# Patient Record
Sex: FEMALE | Race: WHITE
[De-identification: names, ages, dates, MRNs, and addresses within clinical notes are randomized per-mention and may not be internally consistent; named-entity substitution may affect disease eponyms.]

---

## 2017-01-10 LAB
ANION GAP SERPL CALC-SCNC: 9 MMOL/L (ref 3–11)
APPEARANCE UR: CLEAR
BASOPHILS # BLD: 0.02 K/UL (ref 0–0.2)
BASOPHILS NFR BLD: 0.3 %
BILIRUB UR-MCNC: (no result) MG/DL
BUN SERPL-MCNC: 23 MG/DL (ref 7–18)
BUN/CREAT SERPL: 19.4 (ref 10–20)
CALCIUM SERPL-MCNC: 9.2 MG/DL (ref 8.5–10.1)
CHLORIDE SERPL-SCNC: 104 MMOL/L (ref 98–107)
CO2 SERPL-SCNC: 29 MMOL/L (ref 21–32)
COLOR UR: YELLOW
COMPLETE: YES
CREAT CL PREDICTED SERPL C-G-VRATE: 40.2 ML/MIN
CREAT SERPL-MCNC: 1.2 MG/DL (ref 0.6–1.2)
EOSINOPHIL NFR BLD AUTO: 237 K/UL (ref 130–400)
GLUCOSE SERPL-MCNC: 95 MG/DL (ref 70–99)
HCT VFR BLD CALC: 38.2 % (ref 37–47)
IG%: 0.1 %
IMM GRANULOCYTES NFR BLD AUTO: 15.1 %
LYMPHOCYTES # BLD: 1.03 K/UL (ref 1.2–3.4)
MANUAL MICROSCOPIC REQUIRED?: NO
MCH RBC QN AUTO: 33.2 PG (ref 25–34)
MCHC RBC AUTO-ENTMCNC: 32.7 G/DL (ref 32–36)
MCV RBC AUTO: 101.6 FL (ref 80–100)
MONOCYTES NFR BLD: 7.8 %
NEUTROPHILS # BLD AUTO: 1.3 %
NEUTROPHILS NFR BLD AUTO: 75.4 %
NITRITE UR QL STRIP: (no result)
PH UR STRIP: 5 [PH] (ref 4.5–7.5)
PMV BLD AUTO: 10.4 FL (ref 7.4–10.4)
POTASSIUM SERPL-SCNC: 4 MMOL/L (ref 3.5–5.1)
RBC # BLD AUTO: 3.76 M/UL (ref 4.2–5.4)
REVIEW REQ?: NO
SODIUM SERPL-SCNC: 142 MMOL/L (ref 136–145)
SP GR UR STRIP: 1.02 (ref 1–1.03)
URINE BILL WITH OR WITHOUT MIC: (no result)
URINE EPITHELIAL CELL AUTO: >30 /LPF (ref 0–5)
UROBILINOGEN UR-MCNC: (no result) MG/DL
WBC # BLD AUTO: 6.82 K/UL (ref 4.8–10.8)

## 2017-01-10 NOTE — DIAGNOSTIC IMAGING REPORT
TWO VIEW CHEST



CLINICAL HISTORY: Preoperative examination.



FINDINGS: PA and lateral chest radiographs are compared to study dated

11/17/2014 and correlated with chest CT dated 6/23/2008. The heart is enlarged

and there is atherosclerotic calcification of the thoracic aorta. The pulmonary

vasculature is noncongested.  Chronic interstitial thickening is similar to

previous. The lungs and pleural spaces are otherwise clear. There is no

pneumothorax. The skeletal structures are osteopenic. Degenerative change is

noted in the thoracic spine. Surgical anchors are identified in the right

humeral head.



IMPRESSION: Cardiac enlargement with no active disease in the chest.







Electronically signed by:  Sha Frederick M.D.

1/10/2017 10:19 AM



Dictated Date/Time:  1/10/2017 10:18 AM

## 2017-01-10 NOTE — PAT MEDICATION INSTRUCTIONS
Service Date


Jung 10, 2017.





Current Home Medication List


Acetaminophen (Tylenol Arthritis Ext Rel), 1,300 MG PO PRN


Acetaminophen/Diphenhydramine (Tylenol Pm), 1 TAB PO HS


Amlodipine (Norvasc), 5 MG PO QAM


Aspirin (Aspirin Ec), 81 MG PO QAM


Calcium Carbonate-Cholecalcife (Calcium 1000 + D), 1 TAB PO QAM


Cholecalciferol (Vitamin D3), 1 CAP PO QAM


Esomeprazole Magnesium (Nexium), 40 MG PO BID


Folic Acid (Folic Acid), 1 TAB PO 6XWEEK


Gabapentin (Neurontin), 100 MG PO BID


Methotrexate Sodium (Methotrexate), 5 TAB PO WK


Metoprolol Tartrate (Lopressor) (Lopressor), 50 MG PO BID


Prednisone (Prednisone), 5 MG PO QAM


Rosuvastatin Calcium (Crestor), 20 MG PO QPM


Tramadol (Ultram), 50 MG PO BID PRN for Pain


[Vitamin B 12], 1 DOSE PO QAM





Medication Instructions


For Your Scheduled Surgery 





- Check with surgeon/prescribing physician for instructions: 


Methotrexate Sodium (Methotrexate), 5 TAB PO WK





- Hold the following medications the morning of surgery:


[Vitamin B 12], 1 DOSE PO QAM


Folic Acid (Folic Acid), 1 TAB PO 6XWEEK


Cholecalciferol (Vitamin D3), 1 CAP PO QAM


Calcium Carbonate-Cholecalcife (Calcium 1000 + D), 1 TAB PO QAM





- Take the following medications the morning of surgery with a sip of water:


Metoprolol Tartrate (Lopressor) (Lopressor), 50 MG PO BID


Prednisone (Prednisone), 5 MG PO QAM


Gabapentin (Neurontin), 100 MG PO BID


Esomeprazole Magnesium (Nexium), 40 MG PO BID


Amlodipine (Norvasc), 5 MG PO QAM


Tramadol (Ultram), 50 MG PO BID PRN for Pain (okay to take up to 4 hours prior 

to surgery if needed)


Acetaminophen (Tylenol Arthritis Ext Rel), 1,300 MG PO PRN (if needed)


Aspirin (Aspirin Ec), 81 MG PO QAM (okay per surgeon instructions)





- Take the following medications as scheduled the night before surgery:


Rosuvastatin Calcium (Crestor), 20 MG PO QPM


Metoprolol Tartrate (Lopressor) (Lopressor), 50 MG PO BID


Gabapentin (Neurontin), 100 MG PO BID


Esomeprazole Magnesium (Nexium), 40 MG PO BID


Tramadol (Ultram), 50 MG PO BID PRN for Pain (if needed)


Acetaminophen/Diphenhydramine (Tylenol Pm), 1 TAB PO HS


Acetaminophen (Tylenol Arthritis Ext Rel), 1,300 MG PO PRN (if needed)





If you have any questions please call us at [phone] (Rae Sierra PA-C)

 or [phone] or [phone]

## 2017-01-30 ENCOUNTER — HOSPITAL ENCOUNTER (INPATIENT)
Dept: HOSPITAL 45 - C.ACU | Age: 82
LOS: 7 days | Discharge: NURSING, REHAB OR LONG-TERM CARE | DRG: 459 | End: 2017-02-06
Attending: ORTHOPAEDIC SURGERY | Admitting: ORTHOPAEDIC SURGERY
Payer: COMMERCIAL

## 2017-01-30 VITALS
DIASTOLIC BLOOD PRESSURE: 71 MMHG | SYSTOLIC BLOOD PRESSURE: 180 MMHG | HEART RATE: 62 BPM | TEMPERATURE: 97.7 F | OXYGEN SATURATION: 97 %

## 2017-01-30 VITALS
BODY MASS INDEX: 32.76 KG/M2 | HEIGHT: 65 IN | WEIGHT: 196.65 LBS | BODY MASS INDEX: 32.76 KG/M2 | WEIGHT: 196.65 LBS | HEIGHT: 65 IN | BODY MASS INDEX: 32.76 KG/M2

## 2017-01-30 VITALS
DIASTOLIC BLOOD PRESSURE: 67 MMHG | TEMPERATURE: 97.52 F | HEART RATE: 61 BPM | OXYGEN SATURATION: 95 % | SYSTOLIC BLOOD PRESSURE: 167 MMHG

## 2017-01-30 VITALS — DIASTOLIC BLOOD PRESSURE: 72 MMHG | HEART RATE: 64 BPM | SYSTOLIC BLOOD PRESSURE: 179 MMHG

## 2017-01-30 VITALS — DIASTOLIC BLOOD PRESSURE: 68 MMHG | SYSTOLIC BLOOD PRESSURE: 147 MMHG | HEART RATE: 61 BPM | OXYGEN SATURATION: 95 %

## 2017-01-30 VITALS — OXYGEN SATURATION: 95 %

## 2017-01-30 VITALS
TEMPERATURE: 97.52 F | HEART RATE: 58 BPM | OXYGEN SATURATION: 90 % | DIASTOLIC BLOOD PRESSURE: 75 MMHG | SYSTOLIC BLOOD PRESSURE: 150 MMHG

## 2017-01-30 VITALS — OXYGEN SATURATION: 96 % | SYSTOLIC BLOOD PRESSURE: 165 MMHG | DIASTOLIC BLOOD PRESSURE: 76 MMHG | HEART RATE: 68 BPM

## 2017-01-30 VITALS
OXYGEN SATURATION: 93 % | SYSTOLIC BLOOD PRESSURE: 145 MMHG | TEMPERATURE: 97.88 F | DIASTOLIC BLOOD PRESSURE: 86 MMHG | HEART RATE: 60 BPM

## 2017-01-30 VITALS — DIASTOLIC BLOOD PRESSURE: 108 MMHG | HEART RATE: 55 BPM | TEMPERATURE: 98.42 F | SYSTOLIC BLOOD PRESSURE: 128 MMHG

## 2017-01-30 VITALS
SYSTOLIC BLOOD PRESSURE: 148 MMHG | OXYGEN SATURATION: 96 % | DIASTOLIC BLOOD PRESSURE: 70 MMHG | TEMPERATURE: 97.34 F | HEART RATE: 55 BPM

## 2017-01-30 VITALS
TEMPERATURE: 97.88 F | HEART RATE: 67 BPM | DIASTOLIC BLOOD PRESSURE: 70 MMHG | OXYGEN SATURATION: 96 % | SYSTOLIC BLOOD PRESSURE: 169 MMHG

## 2017-01-30 DIAGNOSIS — G92: ICD-10-CM

## 2017-01-30 DIAGNOSIS — D72.829: ICD-10-CM

## 2017-01-30 DIAGNOSIS — I13.0: ICD-10-CM

## 2017-01-30 DIAGNOSIS — M43.17: ICD-10-CM

## 2017-01-30 DIAGNOSIS — Z79.52: ICD-10-CM

## 2017-01-30 DIAGNOSIS — B37.9: ICD-10-CM

## 2017-01-30 DIAGNOSIS — N39.0: ICD-10-CM

## 2017-01-30 DIAGNOSIS — I50.31: ICD-10-CM

## 2017-01-30 DIAGNOSIS — N18.3: ICD-10-CM

## 2017-01-30 DIAGNOSIS — K21.9: ICD-10-CM

## 2017-01-30 DIAGNOSIS — E78.5: ICD-10-CM

## 2017-01-30 DIAGNOSIS — Z83.3: ICD-10-CM

## 2017-01-30 DIAGNOSIS — Z79.4: ICD-10-CM

## 2017-01-30 DIAGNOSIS — Z87.891: ICD-10-CM

## 2017-01-30 DIAGNOSIS — R13.10: ICD-10-CM

## 2017-01-30 DIAGNOSIS — E11.9: ICD-10-CM

## 2017-01-30 DIAGNOSIS — I25.10: ICD-10-CM

## 2017-01-30 DIAGNOSIS — K56.7: ICD-10-CM

## 2017-01-30 DIAGNOSIS — L40.50: ICD-10-CM

## 2017-01-30 DIAGNOSIS — G47.00: ICD-10-CM

## 2017-01-30 DIAGNOSIS — D62: ICD-10-CM

## 2017-01-30 DIAGNOSIS — B95.2: ICD-10-CM

## 2017-01-30 DIAGNOSIS — Z82.3: ICD-10-CM

## 2017-01-30 DIAGNOSIS — J81.1: ICD-10-CM

## 2017-01-30 DIAGNOSIS — R41.0: ICD-10-CM

## 2017-01-30 DIAGNOSIS — Z82.49: ICD-10-CM

## 2017-01-30 DIAGNOSIS — N20.0: ICD-10-CM

## 2017-01-30 DIAGNOSIS — M48.07: Primary | ICD-10-CM

## 2017-01-30 LAB
ANION GAP SERPL CALC-SCNC: 10 MMOL/L (ref 3–11)
BASOPHILS # BLD: 0.01 K/UL (ref 0–0.2)
BASOPHILS NFR BLD: 0.1 %
BUN SERPL-MCNC: 27 MG/DL (ref 7–18)
BUN/CREAT SERPL: 22.8 (ref 10–20)
CALCIUM SERPL-MCNC: 8.4 MG/DL (ref 8.5–10.1)
CHLORIDE SERPL-SCNC: 104 MMOL/L (ref 98–107)
CO2 SERPL-SCNC: 28 MMOL/L (ref 21–32)
COMPLETE: YES
CREAT CL PREDICTED SERPL C-G-VRATE: 40.2 ML/MIN
CREAT SERPL-MCNC: 1.2 MG/DL (ref 0.6–1.2)
EOSINOPHIL NFR BLD AUTO: 171 K/UL (ref 130–400)
EST. AVERAGE GLUCOSE BLD GHB EST-MCNC: 131 MG/DL
GLUCOSE SERPL-MCNC: 192 MG/DL (ref 70–99)
HCT VFR BLD CALC: 33.1 % (ref 37–47)
IG%: 0.2 %
IMM GRANULOCYTES NFR BLD AUTO: 3.6 %
LYMPHOCYTES # BLD: 0.37 K/UL (ref 1.2–3.4)
MCH RBC QN AUTO: 32.7 PG (ref 25–34)
MCHC RBC AUTO-ENTMCNC: 32.3 G/DL (ref 32–36)
MCV RBC AUTO: 101.2 FL (ref 80–100)
MONOCYTES NFR BLD: 1.6 %
NEUTROPHILS # BLD AUTO: 0 %
NEUTROPHILS NFR BLD AUTO: 94.5 %
PMV BLD AUTO: 9.9 FL (ref 7.4–10.4)
POTASSIUM SERPL-SCNC: 3.8 MMOL/L (ref 3.5–5.1)
RBC # BLD AUTO: 3.27 M/UL (ref 4.2–5.4)
SODIUM SERPL-SCNC: 142 MMOL/L (ref 136–145)
WBC # BLD AUTO: 10.19 K/UL (ref 4.8–10.8)

## 2017-01-30 PROCEDURE — 0SG30J1 FUSION OF LUMBOSACRAL JOINT WITH SYNTHETIC SUBSTITUTE, POSTERIOR APPROACH, POSTERIOR COLUMN, OPEN APPROACH: ICD-10-PCS | Performed by: ORTHOPAEDIC SURGERY

## 2017-01-30 RX ADMIN — HYDROMORPHONE HYDROCHLORIDE PRN MG: 10 INJECTION, SOLUTION INTRAMUSCULAR; INTRAVENOUS; SUBCUTANEOUS at 23:13

## 2017-01-30 RX ADMIN — INSULIN ASPART SCH UNITS: 100 INJECTION, SOLUTION INTRAVENOUS; SUBCUTANEOUS at 20:49

## 2017-01-30 RX ADMIN — ROSUVASTATIN CALCIUM SCH MG: 20 TABLET, FILM COATED ORAL at 20:57

## 2017-01-30 RX ADMIN — METOPROLOL TARTRATE SCH MG: 50 TABLET, FILM COATED ORAL at 20:57

## 2017-01-30 RX ADMIN — STANDARDIZED SENNA CONCENTRATE AND DOCUSATE SODIUM SCH TAB: 8.6; 5 TABLET ORAL at 20:57

## 2017-01-30 RX ADMIN — HYDROMORPHONE HYDROCHLORIDE PRN MG: 10 INJECTION, SOLUTION INTRAMUSCULAR; INTRAVENOUS; SUBCUTANEOUS at 15:04

## 2017-01-30 RX ADMIN — GABAPENTIN SCH MG: 100 CAPSULE ORAL at 20:57

## 2017-01-30 RX ADMIN — DEXAMETHASONE SODIUM PHOSPHATE SCH MLS/MIN: 4 INJECTION, SOLUTION INTRA-ARTICULAR; INTRALESIONAL; INTRAMUSCULAR; INTRAVENOUS; SOFT TISSUE at 21:49

## 2017-01-30 RX ADMIN — INSULIN ASPART SCH UNITS: 100 INJECTION, SOLUTION INTRAVENOUS; SUBCUTANEOUS at 16:59

## 2017-01-30 RX ADMIN — HYDROMORPHONE HYDROCHLORIDE PRN MG: 10 INJECTION, SOLUTION INTRAMUSCULAR; INTRAVENOUS; SUBCUTANEOUS at 12:29

## 2017-01-30 RX ADMIN — SODIUM CHLORIDE, SODIUM LACTATE, POTASSIUM CHLORIDE, AND CALCIUM CHLORIDE SCH MLS/HR: 600; 310; 30; 20 INJECTION, SOLUTION INTRAVENOUS at 13:26

## 2017-01-30 RX ADMIN — SODIUM CHLORIDE, SODIUM LACTATE, POTASSIUM CHLORIDE, AND CALCIUM CHLORIDE SCH MLS/HR: 600; 310; 30; 20 INJECTION, SOLUTION INTRAVENOUS at 17:49

## 2017-01-30 RX ADMIN — CLINDAMYCIN PHOSPHATE SCH MLS/HR: 150 INJECTION, SOLUTION INTRAVENOUS at 21:49

## 2017-01-30 RX ADMIN — DEXAMETHASONE SODIUM PHOSPHATE SCH MLS/MIN: 4 INJECTION, SOLUTION INTRA-ARTICULAR; INTRALESIONAL; INTRAMUSCULAR; INTRAVENOUS; SOFT TISSUE at 13:29

## 2017-01-30 RX ADMIN — CLINDAMYCIN PHOSPHATE SCH MLS/HR: 150 INJECTION, SOLUTION INTRAVENOUS at 13:29

## 2017-01-30 RX ADMIN — PANTOPRAZOLE SCH MG: 40 TABLET, DELAYED RELEASE ORAL at 20:58

## 2017-01-30 NOTE — OPERATIVE REPORT
DATE OF OPERATION:  01/30/2017

 

PREOPERATIVE DIAGNOSES:  Spinal stenosis, spondylolisthesis.

 

POSTOPERATIVE DIAGNOSIS:  Same.

 

PROCEDURE PERFORMED:

1.  Revision decompression, medial facetectomy and foraminotomy L2-3, L3-4,

L4-5, L5-S1.

2.  Posterior spinal fusion L2-3, L3-4, L4-5, L5-S1.

3.  Bilateral SI joint fusions.

4.  Placement of posterior segmental instrumentation using Medicrea rods and

screws L2-S1 with bilateral iliac bolts.  

5.  Interbody fusion L4-L5.

6.  Placement of PEEK cage 11 x 26 at L4-L5.

7.  Placement of locally harvested morselized autograft in posterior lateral

gutters.

8.  Placement of Infuse collagen sponge combined with Mastergraft in the

posterior lateral gutters and Brigida bone grafting interbody space.

 

SURGEON:  Dr. Viktor Blunt.

 

ASSISTANT:  None.

 

ANESTHESIA:  General.

 

DISPOSITION:  The patient awakened and taken to PACU in stable condition.

 

HISTORY OF PATIENT'S PROBLEMS:  An 81-year-old female that presents with

above-mentioned diagnosis.  After failing an extensive course of nonoperative

care, elected to undergo the above-mentioned procedure.  Risks, benefits,

pros, cons, and alternatives were outlined in detail preoperatively.

 

PROCEDURE:  The patient was met with preoperatively, case discussed and all

questions were addressed.  At that point the patient was taken back to

operative suite and after undergoing successful general intubation by the

department of anesthesia was placed in prone position on Anthony table atop

Connor frame.  All bony prominences were well padded and the eyes were

inspected to ensure there was no external pressure placed upon them.  At this

point, the thoracolumbar spine was prepped and draped in normal sterile

fashion.  Sharp dissection with the assistance of Bovie cautery was performed

down to and exposing the remaining lamina and transverse processes of 2, 3,

4, 5 and sacral ala bilaterally.  From a caudal to cephalad fashion, a

revision decompression laminectomy of L5, 4, 3, and 2 was performed

addressing severe central lateral recess stenosis.  The dura was particularly

patchless at the 4-5 level at that areas of the most extensive decompression.

 I did prophylactically cover this area with DuraSeal.  After this was

complete, pedicle screws were then placed in 2, 3, 4, 5 and S1 levels, as

well as bilateral iliac bolts and through a transforaminal approach on the

left, a complete discectomy of L4-5 was performed, endplates curetted to

subcortical bleeding bone and a 11 x 26 mm PEEK cage filled with Brigida bone

grafting tapped into position.  Appropriate size rods were then contoured and

locked into final position bilaterally and the transverse processes of 2, 3,

4, 5, the sacral ala and bilateral SI joints were burred to subcortical

bleeding bone.  Infuse collagen sponge combined with Mastergraft and locally

harvested morcellized autograft was placed in posterior lateral gutters.  A 7

flat SUE drain inserted.  Incision was closed with 1-0 Vicryl in the fascia,

2-0 Vicryl subcutaneously, 4-0 Monocryl for final skin closure.  Steri-Strips

and sterile dressing placed.  The patient was awakened and taken to PACU in

stable condition.

 

 

I attest to the content of the Intraoperative Record and any orders documented therein. Any exceptio
ns are noted below.

## 2017-01-30 NOTE — DIAGNOSTIC IMAGING REPORT
Lumbar spine LUMBAR SPINE 2 OR 3 VIEW



CLINICAL HISTORY: L2-S1 LUMBAR DECOMPRESSION/FUSION laminectomy



TECHNIQUE: Image intensifier



COMPARISON STUDY:  None



FINDINGS: Findings consistent with laminectomy and fusions from L2 through S1.

Oblique iliac bolts are also noted. Alignment is anatomic



IMPRESSION:  Postoperative lumbosacral spine changes as described 









Electronically signed by:  Abner Casas M.D.

1/30/2017 10:49 AM



Dictated Date/Time:  1/30/2017 10:44 AM

## 2017-01-30 NOTE — MEDICAL CONSULT
Consultation


Date of Consultation:


Jan 30, 2017.


Attending Physician:


Viktor Blunt D.O.


Reason for Consultation:


Medical management


History of Present Illness


This is an 82 y/o female with a history of CKD stage III, HTN, HLD, DM II, 

psoriatic arthritis, CAD and GERD who presents s/p L2-S1 decompression and 

fusion with Dr. Blunt on 1/30 for medical management.  The patient complains 

of some phlegm stuck in her throat and still has some tingling from the 

anesthesia but otherwise has no other complaints.  She is tolerating a PO diet.

  She denies passing gas or having a bowel movement yet.  A Kumar catheter is 

in place and is draining clear urine.  The patient denies fevers, chills, sweats

, chest pain, palpitations, claudication, cough, wheezing, shortness of breath, 

nausea, vomiting, abdominal pain, dysuria, hematuria, urinary retention, 

paralysis,  and weakness.





Past Medical/Surgical History


CKD stage III





HTN





HLD





DM II





Psoriatic arthritis





CAD, h/o cardiac stent





GERD





Family History





Coronary artery disease


Diabetes mellitus


Hypertension


Stroke





Social History


Smoking Status:  Former Smoker


Smokeless Tobacco Use:  No


Alcohol Use:  occasionally


Drug Use:  none


Marital Status:  


Housing Status:  lives with significant other


Occupation Status:  retired





Allergies


Coded Allergies:  


     Atorvastatin (Verified  Allergy, Mild, Unknown, on Crestor PTHM [id number] 

adm, 1/30/17)


     Azithromycin (Verified  Allergy, Mild, Unknown, 1/30/17)


     Penicillins (Verified  Allergy, Unknown, UNKNOWN, 1/30/17)


     Oxycodone (Verified  Adverse Reaction, Intermediate, NAUSEA AND VOMITING, 1 /30/17)





Current Inpatient Medications





 Current Inpatient Medications








 Medications


  (Trade)  Dose


 Ordered  Sig/Cora


 Route  Start Time


 Stop Time Status Last Admin


Dose Admin


 


 Lactated Ringer's


  (Lr 1000ml)  1,000 ml @ 


 15 mls/hr  Q24H


 IV  1/30/17 06:00


 1/31/17 05:59   


 


 


 Fentanyl Citrate


  (Fentanyl Inj)  25 mcg  Q5M  PRN


 IV  1/30/17 08:00


 1/30/17 13:00   


 


 


 Hydromorphone HCl


  (Dilaudid Inj)  0.25 mg  Q5M  PRN


 IV  1/30/17 08:00


 1/30/17 13:00   


 


 


 Ondansetron HCl


  (Zofran Inj)  4 mg  ONE  PRN


 IV  1/30/17 08:00


 1/30/17 13:00   


 


 


 Ephedrine Sulfate


  (EpHEDrine


 SULFATE INJ)  5 mg  Q5M  PRN


 IV  1/30/17 08:00


 1/30/17 13:00   


 


 


 Atropine Sulfate


  (Atropine


 Sulfate 0.1MG/Ml


 Inj)  0.5 mg  Q1M  PRN


 IV  1/30/17 08:00


 1/30/17 13:00   


 


 


 Miscellaneous


  (Floseal


 Hemostatic Matrix


 10ml)  32 ml  ONE  ONCE


 TOP  1/30/17 10:29


 1/30/17 10:30  1/30/17 10:29


32 ML


 


 Bacitracin


  (Bacitracin Inj)  50,000 units  ONE  ONCE


 IR  1/30/17 10:29


 1/30/17 10:30  1/30/17 10:29


50,000 UNITS


 


 Bupivacaine HCl/


 Epinephrine Bitart


  (Sensorcaine/


 Epinephrine 0.5%


 Mpf 1:200,000)  30 ml  ONE  ONCE


 INJ  1/30/17 10:29


 1/30/17 10:30  1/30/17 10:29


30 ML


 


 Miscellaneous 5 ml  5 ml  ONE  ONCE


 TOP  1/30/17 10:30


 1/30/17 10:31  1/30/17 10:30


5 ML


 


 Clindamycin


 Phosphate 600 mg/


 Dextrose  54 ml @ 


 100 mls/hr  Q8H


 IV  1/30/17 14:00


 1/30/17 22:33  1/30/17 13:29


100 MLS/HR


 


 Dexamethasone


 Sodium Phosphate


 6 mg/Syringe  1.5 ml @ 1


 mls/min  Q8H


 IV  1/30/17 14:00


 1/31/17 06:02  1/30/17 13:29


1 MLS/MIN


 


 Promethazine HCl/


 Sodium Chloride


  (Phenergan Inj/


 Nss 50ml)  50.5 ml @ 


 202 mls/hr  Q6H  PRN


 IV  1/30/17 11:00


 3/1/17 10:59   


 


 


 Ondansetron HCl


  (Zofran Inj)  4 mg  Q6H  PRN


 IV  1/30/17 11:00


 3/1/17 10:59   


 


 


 Metoclopramide HCl


  (Reglan Inj)  10 mg  Q6H  PRN


 IV  1/30/17 11:00


 3/1/17 10:59   


 


 


 Lorazepam 0.5 mg  0.5 mg  Q8H  PRN


 PO  1/30/17 11:00


 3/1/17 10:59   


 


 


 Lorazepam/Syringe


  (Ativan Inj/


 Syringe)  0.25 ml @ 


 1 mls/min  Q8H  PRN


 IV  1/30/17 11:00


 3/1/17 10:59   


 


 


 Pneumococcal


 Polysaccharide


 Vaccine  1 ea  PRN  PRN


 N/A  1/30/17 11:00


 3/1/17 10:59   


 


 


 Influenza Virus


 Vacc Triv Types


 A&B  1 ea  PRN  PRN


 N/A  1/30/17 11:00


 3/1/17 10:59   


 


 


 Polyethylene


  (Miralax Powder


 Packet)  17 gm  Q6


 PO  2/1/17 06:00


 3/3/17 05:59   


 


 


 Bisacodyl


  (Dulcolax Supp)  10 mg  DAILY  PRN


 FL  1/30/17 11:00


 3/1/17 10:59   


 


 


 Magnesium


 Hydroxide


  (Milk Of


 Magnesia Susp)  30 ml  DAILY  PRN


 PO  1/30/17 11:00


 3/1/17 10:59   


 


 


 Hydromorphone HCl


  (Dilaudid Inj)  0.5-1mg


 prn


 moder...  Q3H  PRN


 IV  1/31/17 06:00


 2/14/17 05:59   


 


 


 Acetaminophen/


 Hydrocodone


 Bitart  1-2 tabs


 prn


 moder...  Q4H  PRN


 PO  1/31/17 06:00


 2/14/17 05:59   


 


 


 Lactated Ringer's


  (Lr 1000ml)  1,000 ml @ 


 150 mls/hr  Q6H40M


 IV  1/30/17 10:56


 3/1/17 10:55  1/30/17 13:26


150 MLS/HR


 


 Acetaminophen


 1000 mg  1,000 mg  Q8H  PRN


 PO  1/30/17 11:00


 3/1/17 10:59   


 


 


 Acetaminophen


  (Ofirmev Iv)  100 ml @ 


 400 mls/hr  Q8H  PRN


 IV  1/30/17 11:00


 3/1/17 10:59   


 


 


 Naloxone HCl


  (Narcan Inj)  0.1 mg  Q5M  PRN


 IV  1/30/17 11:00


 3/1/17 10:59   


 


 


 Senna/Docusate


 Sodium


  (Senokot S Tab)  2 tab  HS


 PO  1/30/17 21:00


 3/1/17 20:59   


 


 


 Sodium


 Biphosphate/


 Sodium Phosphate


  (Fleet Enema)  132 ml  ONE  PRN


 FL  1/30/17 11:00


 3/1/17 10:59   


 


 


 Hydroxyzine HCl


  (Vistaril Tab)  25 mg  Q8H  PRN


 PO  1/30/17 11:00


 3/1/17 10:59   


 


 


 Al Hydroxide/Mg


 Hydroxide


  (Maalox Susp)  30 ml  Q6H  PRN


 PO  1/30/17 11:00


 3/1/17 10:59   


 


 


 Famotidine


  (Pepcid Tab)  20 mg  Q12  PRN


 PO  1/30/17 11:00


 3/1/17 10:59   


 


 


 Diphenhydramine


 HCl


  (Benadryl Cap)  25 mg  Q6H  PRN


 PO  1/30/17 11:00


 3/1/17 10:59   


 


 


 Miscellaneous


 Information


  (Discontinue PCA)  1 ea  ONE  ONCE


 N/A  1/31/17 06:00


 1/31/17 06:01   


 


 


 Naloxone HCl


  (Narcan Inj)  0.1 mg  Q5M  PRN


 IV  1/30/17 11:00


 1/31/17 06:00   


 


 


 Hydromorphone HCl


 25 mg  25 mg  PRN  PRN


 IV  1/30/17 11:00


 1/31/17 06:00  1/30/17 12:29


25 MG


 


 Sodium Chloride


  (Nss 1000ml)  1,000 ml @ 


 15 mls/hr  Q24H


 IV  1/30/17 10:56


 1/31/17 06:00   


 


 


 Amlodipine


 Besylate


  (Norvasc Tab)  5 mg  QAM


 PO  1/31/17 09:00


 3/2/17 08:59   


 


 


 Aspirin


  (Ecotrin Tab)  81 mg  QAM


 PO  1/31/17 09:00


 3/2/17 08:59   


 


 


 Gabapentin


  (Neurontin Cap)  100 mg  BID


 PO  1/30/17 21:00


 3/1/17 20:59   


 


 


 Metoprolol


 Tartrate


  (Lopressor Tab)  50 mg  BID


 PO  1/30/17 21:00


 3/1/17 20:59   


 


 


 Prednisone


  (PredniSONE TAB)  5 mg  QAM


 PO  1/31/17 09:00


 3/2/17 08:59   


 


 


 Rosuvastatin


 Calcium


  (Crestor Tab)  20 mg  QPM


 PO  1/30/17 21:00


 3/1/17 20:59   


 


 


 Tramadol HCl


  (Ultram Tab)  50 mg  BID  PRN


 PO  1/31/17 06:00


 3/2/17 05:59   


 


 


 Pantoprazole


 Sodium


  (Protonix Tab)  40 mg  BID


 PO  1/30/17 21:00


 3/1/17 20:59   


 


 


 Insulin Aspart


  (novoLOG ASPART)  **SLIDING


 SCALE**


 **G...  ACHS


 SC  1/30/17 17:15


 3/1/17 17:14 UNV  


 


 


 Glucose


  (Glucose 40% Gel)  15-30


 GRAMS 15


 GRAMS...  UD  PRN


 PO  1/30/17 13:45


 3/1/17 13:44   


 


 


 Glucose


  (Glucose Chew


 Tab)  4-8


 Tablets 4


 Tabl...  UD  PRN


 PO  1/30/17 13:45


 3/1/17 13:44   


 


 


 Dextrose


  (Dextrose 50%


 50ML Syringe)  25-50ML OF


 50% DW IV


 FOR...  UD  PRN


 IV  1/30/17 13:45


 3/1/17 13:44   


 


 


 Glucagon


  (Glucagon Inj)  1 mg  UD  PRN


 SQ  1/30/17 13:45


 3/1/17 13:44   


 











Review of Systems


See HPI for pertinent positives and negatives.  All other systems reviewed and 

negative.





Physical Exam











  Date Time  Temp Pulse Resp B/P Pulse Ox O2 Delivery O2 Flow Rate FiO2


 


1/30/17 13:25 36.6 60 16 145/86 93  4.0 


 


1/30/17 12:53     90 Nasal Cannula 4.0 


 


1/30/17 12:47  61 16 147/68 95  4.0 


 


1/30/17 12:25 36.4 58 16 150/75 90 Nasal Cannula 4.0 


 


1/30/17 12:25     90 Nasal Cannula 4.0 


 


1/30/17 12:00  59 12     


 


1/30/17 12:00  59 12  97   


 


1/30/17 11:59    176/61    


 


1/30/17 11:57 36.8 65 18 176/61 96 Nasal Cannula 4 


 


1/30/17 11:55  63 20     


 


1/30/17 11:55   20     


 


1/30/17 11:54    164/66    


 


1/30/17 11:50  60 17  98   


 


1/30/17 11:50  61 17     


 


1/30/17 11:48    178/66    


 


1/30/17 11:45  59 14     


 


1/30/17 11:45  59 14  97   


 


1/30/17 11:44  61 14     


 


1/30/17 11:44  61 14  97   


 


1/30/17 11:43    184/75    


 


1/30/17 11:39  62 22 186/62 96   


 


1/30/17 11:39  62 22     


 


1/30/17 11:36    130/106    


 


1/30/17 11:34  64 22  93   


 


1/30/17 11:34  64 22     


 


1/30/17 11:30 36.1 68 20 177/93 99 Mask 10 


 


1/30/17 11:29  64 24 181/119 100   





    181/119    


 


1/30/17 11:29  64 24     


 


1/30/17 11:24  66 18     


 


1/30/17 11:24  66 18  100   


 


1/30/17 11:20    184/87    


 


1/30/17 11:19  69 17     


 


1/30/17 11:19  69 17  100   


 


1/30/17 11:14  68 17     


 


1/30/17 11:14  68 17  99   


 


1/30/17 06:02     95 Room Air  


 


1/30/17 05:55 36.9 55 18 128/108    








General Appearance:  WD/WN, no apparent distress, + obese


Head:  normocephalic, atraumatic


Eyes:  normal inspection, PERRL, EOMI


ENT:  normal ENT inspection, hearing grossly normal, pharynx normal


Neck:  supple, no JVD, trachea midline


Respiratory/Chest:  lungs clear, normal breath sounds, no respiratory distress


Cardiovascular:  regular rate, rhythm, no gallop, + systolic murmur


Abdomen/GI:  normal bowel sounds, non tender, soft


Extremities/Musculoskelatal:  normal inspection, no calf tenderness, no pedal 

edema


Neurologic/Psych:  alert, normal mood/affect, oriented x 3


Skin:  normal color, warm/dry, no rash





Laboratory Results





Last 24 Hours








Test


  1/30/17


11:26 1/30/17


13:31


 


Bedside Glucose 163 mg/dl  











Assessment & Plan


82 y/o female with a history of CKD stage III, HTN, HLD, DM II, psoriatic 

arthritis, CAD and GERD who presents s/p L2-S1 decompression and fusion with 

Dr. Blunt on 1/30 for medical management. 


-Pain management, DVT prophylaxis, and PT/OT as per primary team





CKD stage III--baseline creatinine 1.2


-Monitor with PRP





HTN--stable


-Continue Norvasc 5 mg PO qd and Lopressor 50 mg PO BID





HLD


-Continue rosuvastatin 20 mg PO qd





Diabetes mellitus type 2--Last HgbA1c checked 5/25/16 was 6.2.  Pt is diet 

controlled


-Insulin sliding scale


-Check BSGs q ac and qhs


-Recheck HgbA1c





Psoriatic arthritis


-Hold methotrexate, pt takes 12.5 mg PO once weekly


-Continue Prednisone 5 mg PO qd





GERD


-Continue esomeprazole 40 mg PO BID





Code Status


-Level I, FULL RESUSCITATION STATUS





------------------------------------------------------------------------


Attending Consult note & attestation:


Pt seen/examined, chart reviewed, and care plan d/w SHERRI Ballard.


I agree w/ the key components of her consult documentation.  





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 

arthritis, CAD, and HTN who underwent elective lumbar decompression/fusion 

surgery earlier today by Dr. Blunt.


During my visit she complains of sore throat, mild dysphagia, frequent 

awakenings despite trying to sleep, "panic", mild subjective dyspnea but no 

chest pain. 


She also mentions mild stomach bloating. 


Reports frequent heartburn at home.  


Denies cough. 





PMH, PSH, allergies, meds, sochx, famhx, ros - reviewed 





vitals - stable, afebrile, o2 sats acceptable on NC O2 


gen - anxious, but NAD; a/o x 3; no dyspnea or distress


mouth - no lesions, MM dry


throat - clear, no edema, no erythema


neck - no JVD


heart - RRR, s1, s2, 1/6 FERNANDO LSB


lungs - CTA b/l, no rales/stridor/wheeze


abd - minimal distension, BS+, NT, no HSM


ext - no edema


neuro - strength 5/5 x 4 exts; no facial droop





FSBS < 200 since surgery 





A/P:


1.  s/p lumbar decompression/fusion


2.  subjective sore throat/swelling/dysphagia - may be related to recent 

intubation for surgery, GERD, etc.


Decadron for lumbar spine will help ; will also add H2 blocker in addition to 

her twice daily PPI.


Change diet to mech soft. 


Reassess in AM. 


3.  CAD - no ischemic symptoms post-op.


4.  concern for insomnia - defer med choice to primary ortho team.


5.  T2DM - add carb coverage in addition to correction factor. 


6.  anxiety - follow for now





RENETTA EDWARD MD

## 2017-01-30 NOTE — [facility] POST OPERATIVE BRIEF NOTE
Immediate Operative Summary


Operative Date


Jan 30, 2017.





Pre-Operative Diagnosis





Spinal Stenosis





Post-Operative Diagnosis





Spinal Stenosis





Procedure(s) Performed





L2-S1 Decompression; Posterior Spinal Fusion; Instumentation; Brigida Allograft

, 


Interbody Fusion with Application of Interbody Cage at L4-L5 and Bone 


Morphogenetic Protein with Iliac Bolts





Surgeon


Dr. Viktor Blunt





Assistant Surgeon(s)


Charlotte Craig RN





Estimated Blood Loss


250mL





Findings


stenosis





Specimens





None per surgeon

## 2017-01-30 NOTE — ANESTHESIOLOGY PROGRESS NOTE
Anesthesia Post Op Note


Date & Time


Jan 30, 2017 at 12:03





Vital Signs


Pain Intensity:  0





 Vital Signs Past 12 Hours








  Date Time  Temp Pulse Resp B/P Pulse Ox O2 Delivery O2 Flow Rate FiO2


 


1/30/17 11:44  61 14     


 


1/30/17 11:44  61 14  97   


 


1/30/17 11:43    184/75    


 


1/30/17 11:39  62 22 186/62 96   


 


1/30/17 11:39  62 22     


 


1/30/17 11:36    130/106    


 


1/30/17 11:34  64 22  93   


 


1/30/17 11:34  64 22     


 


1/30/17 11:30 36.1 68 20 177/93 99 Mask 10 


 


1/30/17 11:29  64 24 181/119 100   





    181/119    


 


1/30/17 11:29  64 24     


 


1/30/17 11:24  66 18     


 


1/30/17 11:24  66 18  100   


 


1/30/17 11:20    184/87    


 


1/30/17 11:19  69 17     


 


1/30/17 11:19  69 17  100   


 


1/30/17 11:14  68 17     


 


1/30/17 11:14  68 17  99   


 


1/30/17 06:02     95 Room Air  


 


1/30/17 05:55 36.9 55 18 128/108    











Notes


Mental Status:  alert / awake / arousable, participated in evaluation


Pt Amnestic to Procedure:  Yes


Nausea / Vomiting:  adequately controlled


Pain:  adequately controlled


Airway Patency, RR, SpO2:  stable & adequate


BP & HR:  stable & adequate


Hydration State:  stable & adequate


Anesthetic Complications:  no major complications apparent

## 2017-01-30 NOTE — HISTORY AND PHYSICAL
History & Physical


Date


Jan 30, 2017.





Chief Complaint


back and leg pain





History of Present Illness


The patient is a 81 year old female with complaints of





Additional History


Hepatic Disease:  No


Endocrine Disorder:  No


Kidney Disease:  No


Hypertension:  No


Heart Disease:  No


Bleeding Tendencies:  No


Infectious Diseases:  No





Allergies


Coded Allergies:  


     Atorvastatin (Verified  Allergy, Mild, Unknown, 1/30/17)


     Azithromycin (Verified  Allergy, Mild, Unknown, 1/30/17)


     Penicillins (Verified  Allergy, Unknown, UNKNOWN, 1/30/17)


     Oxycodone (Verified  Adverse Reaction, Intermediate, NAUSEA AND VOMITING, 1 /30/17)





Home Medications


Scheduled


Acetaminophen (Tylenol Arthritis Ext Rel), 1,300 MG PO PRN


Acetaminophen/Diphenhydramine (Tylenol Pm), 1 TAB PO HS


Amlodipine (Norvasc), 5 MG PO QAM


Aspirin (Aspirin Ec), 81 MG PO QAM


Calcium Carbonate-Cholecalcife (Calcium 1000 + D), 1 TAB PO QAM


Cholecalciferol (Vitamin D3), 1 CAP PO QAM


Esomeprazole Magnesium (Nexium), 40 MG PO BID


Folic Acid (Folic Acid), 1 TAB PO 6XWEEK


Gabapentin (Neurontin), 100 MG PO BID


Methotrexate Sodium (Methotrexate), 5 TAB PO WK


Metoprolol Tartrate (Lopressor) (Lopressor), 50 MG PO BID


Prednisone (Prednisone), 5 MG PO QAM


Rosuvastatin Calcium (Crestor), 20 MG PO QPM


[Vitamin B 12], 1 DOSE PO QAM





Scheduled PRN


Tramadol (Ultram), 50 MG PO BID PRN for Pain





Physical Examination


Skin:  warm/dry, no rash


Eyes:  normal inspection, EOMI, sclerae normal


ENT:  normal ENT inspection, pharynx normal


Head:  normocephalic, atraumatic


Neck:  supple, no adenopathy, trachea midline


Respiratory/Chest:  lungs clear, normal breath sounds, no respiratory distress


Cardiovascular:  regular rate, rhythm, no edema, no murmur


Abdomen / GI:  normal bowel sounds, non tender


Back:  normal inspection


Extremities:  normal inspection, normal range of motion


Neurologic/Psych:  no motor/sensory deficits, alert, normal reflexes, oriented 

x 3





Diagnosis


spinal stenosis





Plan of Treatment


decompression fusion L2-S1

## 2017-01-31 VITALS
TEMPERATURE: 97.7 F | DIASTOLIC BLOOD PRESSURE: 68 MMHG | SYSTOLIC BLOOD PRESSURE: 187 MMHG | HEART RATE: 68 BPM | OXYGEN SATURATION: 95 %

## 2017-01-31 VITALS
DIASTOLIC BLOOD PRESSURE: 72 MMHG | SYSTOLIC BLOOD PRESSURE: 195 MMHG | TEMPERATURE: 98.06 F | OXYGEN SATURATION: 97 % | HEART RATE: 70 BPM

## 2017-01-31 VITALS
HEART RATE: 63 BPM | DIASTOLIC BLOOD PRESSURE: 66 MMHG | TEMPERATURE: 98.24 F | SYSTOLIC BLOOD PRESSURE: 167 MMHG | OXYGEN SATURATION: 97 %

## 2017-01-31 VITALS
DIASTOLIC BLOOD PRESSURE: 61 MMHG | TEMPERATURE: 98.78 F | HEART RATE: 78 BPM | OXYGEN SATURATION: 93 % | SYSTOLIC BLOOD PRESSURE: 181 MMHG

## 2017-01-31 VITALS — SYSTOLIC BLOOD PRESSURE: 175 MMHG | DIASTOLIC BLOOD PRESSURE: 69 MMHG

## 2017-01-31 VITALS
DIASTOLIC BLOOD PRESSURE: 71 MMHG | TEMPERATURE: 98.24 F | OXYGEN SATURATION: 93 % | HEART RATE: 66 BPM | SYSTOLIC BLOOD PRESSURE: 154 MMHG

## 2017-01-31 VITALS — DIASTOLIC BLOOD PRESSURE: 54 MMHG | SYSTOLIC BLOOD PRESSURE: 169 MMHG

## 2017-01-31 VITALS
OXYGEN SATURATION: 97 % | SYSTOLIC BLOOD PRESSURE: 140 MMHG | HEART RATE: 64 BPM | DIASTOLIC BLOOD PRESSURE: 62 MMHG | TEMPERATURE: 98.06 F

## 2017-01-31 VITALS — DIASTOLIC BLOOD PRESSURE: 71 MMHG | SYSTOLIC BLOOD PRESSURE: 194 MMHG

## 2017-01-31 VITALS — HEART RATE: 69 BPM | DIASTOLIC BLOOD PRESSURE: 65 MMHG | SYSTOLIC BLOOD PRESSURE: 187 MMHG

## 2017-01-31 LAB
ANION GAP SERPL CALC-SCNC: 11 MMOL/L (ref 3–11)
BASOPHILS # BLD: 0.01 K/UL (ref 0–0.2)
BASOPHILS NFR BLD: 0.1 %
BUN SERPL-MCNC: 24 MG/DL (ref 7–18)
BUN/CREAT SERPL: 21.9 (ref 10–20)
CALCIUM SERPL-MCNC: 8.2 MG/DL (ref 8.5–10.1)
CHLORIDE SERPL-SCNC: 99 MMOL/L (ref 98–107)
CO2 SERPL-SCNC: 25 MMOL/L (ref 21–32)
COMPLETE: YES
CREAT CL PREDICTED SERPL C-G-VRATE: 43.8 ML/MIN
CREAT SERPL-MCNC: 1.1 MG/DL (ref 0.6–1.2)
EOSINOPHIL NFR BLD AUTO: 206 K/UL (ref 130–400)
GLUCOSE SERPL-MCNC: 162 MG/DL (ref 70–99)
HCT VFR BLD CALC: 30.3 % (ref 37–47)
IG%: 0.2 %
IMM GRANULOCYTES NFR BLD AUTO: 2.2 %
LYMPHOCYTES # BLD: 0.29 K/UL (ref 1.2–3.4)
MCH RBC QN AUTO: 32.9 PG (ref 25–34)
MCHC RBC AUTO-ENTMCNC: 33.3 G/DL (ref 32–36)
MCV RBC AUTO: 98.7 FL (ref 80–100)
MONOCYTES NFR BLD: 5.9 %
NEUTROPHILS # BLD AUTO: 0 %
NEUTROPHILS NFR BLD AUTO: 91.6 %
PMV BLD AUTO: 9.8 FL (ref 7.4–10.4)
POTASSIUM SERPL-SCNC: 4.4 MMOL/L (ref 3.5–5.1)
RBC # BLD AUTO: 3.07 M/UL (ref 4.2–5.4)
SODIUM SERPL-SCNC: 135 MMOL/L (ref 136–145)
WBC # BLD AUTO: 13.19 K/UL (ref 4.8–10.8)

## 2017-01-31 RX ADMIN — HYDROCODONE BITARTRATE AND ACETAMINOPHEN PRN TAB: 5; 325 TABLET ORAL at 12:47

## 2017-01-31 RX ADMIN — LORAZEPAM PRN MLS/MIN: 2 INJECTION INTRAMUSCULAR; INTRAVENOUS at 21:51

## 2017-01-31 RX ADMIN — TRAMADOL HYDROCHLORIDE PRN MG: 50 TABLET, COATED ORAL at 09:50

## 2017-01-31 RX ADMIN — STANDARDIZED SENNA CONCENTRATE AND DOCUSATE SODIUM SCH TAB: 8.6; 5 TABLET ORAL at 21:00

## 2017-01-31 RX ADMIN — INSULIN ASPART SCH UNITS: 100 INJECTION, SOLUTION INTRAVENOUS; SUBCUTANEOUS at 18:20

## 2017-01-31 RX ADMIN — INSULIN ASPART SCH UNITS: 100 INJECTION, SOLUTION INTRAVENOUS; SUBCUTANEOUS at 21:00

## 2017-01-31 RX ADMIN — Medication SCH MG: at 09:49

## 2017-01-31 RX ADMIN — INSULIN ASPART SCH UNITS: 100 INJECTION, SOLUTION INTRAVENOUS; SUBCUTANEOUS at 13:17

## 2017-01-31 RX ADMIN — METOPROLOL TARTRATE SCH MG: 50 TABLET, FILM COATED ORAL at 21:41

## 2017-01-31 RX ADMIN — LORAZEPAM PRN MLS/MIN: 2 INJECTION INTRAMUSCULAR; INTRAVENOUS at 02:39

## 2017-01-31 RX ADMIN — GABAPENTIN SCH MG: 100 CAPSULE ORAL at 21:45

## 2017-01-31 RX ADMIN — GABAPENTIN SCH MG: 100 CAPSULE ORAL at 09:49

## 2017-01-31 RX ADMIN — INSULIN ASPART SCH UNITS: 100 INJECTION, SOLUTION INTRAVENOUS; SUBCUTANEOUS at 08:50

## 2017-01-31 RX ADMIN — HYDROCODONE BITARTRATE AND ACETAMINOPHEN PRN TAB: 5; 325 TABLET ORAL at 20:12

## 2017-01-31 RX ADMIN — PANTOPRAZOLE SCH MG: 40 TABLET, DELAYED RELEASE ORAL at 21:00

## 2017-01-31 RX ADMIN — ONDANSETRON PRN MG: 2 INJECTION INTRAMUSCULAR; INTRAVENOUS at 07:40

## 2017-01-31 RX ADMIN — ONDANSETRON PRN MG: 2 INJECTION INTRAMUSCULAR; INTRAVENOUS at 00:55

## 2017-01-31 RX ADMIN — PANTOPRAZOLE SCH MG: 40 TABLET, DELAYED RELEASE ORAL at 08:22

## 2017-01-31 RX ADMIN — METOPROLOL TARTRATE SCH MG: 50 TABLET, FILM COATED ORAL at 08:21

## 2017-01-31 RX ADMIN — SODIUM CHLORIDE, SODIUM LACTATE, POTASSIUM CHLORIDE, AND CALCIUM CHLORIDE SCH MLS/HR: 600; 310; 30; 20 INJECTION, SOLUTION INTRAVENOUS at 01:23

## 2017-01-31 RX ADMIN — ROSUVASTATIN CALCIUM SCH MG: 20 TABLET, FILM COATED ORAL at 21:00

## 2017-01-31 RX ADMIN — DEXAMETHASONE SODIUM PHOSPHATE SCH MLS/MIN: 4 INJECTION, SOLUTION INTRA-ARTICULAR; INTRALESIONAL; INTRAMUSCULAR; INTRAVENOUS; SOFT TISSUE at 05:55

## 2017-01-31 NOTE — DIAGNOSTIC IMAGING REPORT
AP CHEST WITH ABDOMINAL SERIES



CLINICAL HISTORY:  Abdominal distention post surgery.



FINDINGS: 



An AP upright chest radiograph is compared to study dated 1/10/2017. The

Examination is degraded by large body habitus and patient rotation. The heart is

enlarged and there is atherosclerotic calcification of the thoracic aorta. The

pulmonary vasculature is noncongested. Bibasilar atelectasis is observed and

there is chronic interstitial thickening. No focal airspace consolidation or

large pleural effusion is identified. No pneumothorax is seen. The skeletal

structures are osteopenic. The bony thorax is grossly intact. Surgical anchors

are noted in the right humeral head.



Supine and decubitus abdominal radiograph are correlated with abdominal CT dated

11/24/2015. There is a nonobstructed abdominal bowel gas pattern. Mild gaseous

distention of the small bowel and colon may be related to postoperative ileus.

Mild to moderate colonic fecal retention is observed. No evidence of

intraperitoneal free air is seen. There is a nonobstructing right renal

calculus. Phleboliths are seen in the pelvis. There is lumbosacral spondylosis

with extensive spinal fusion hardware. A surgical drain projects over the spine.

Sacroiliac bolts are in place. The bony pelvis appears intact.





IMPRESSION:



1. Cardiomegaly with no acute cardiopulmonary abnormality.



2. Nonobstructed abdominal bowel gas pattern. Mild gaseous distention of the

small bowel and colon may represent a mild postoperative ileus. Clinical

correlation will be required.



3. Nonobstructing right renal calculus.







Electronically signed by:  Sha Frederick M.D.

1/31/2017 5:19 PM



Dictated Date/Time:  1/31/2017 5:16 PM

## 2017-01-31 NOTE — ANESTHESIOLOGY PROGRESS NOTE
Anesthesia Post Op Note


Date & Time


Jan 31, 2017 at 07:59





Vital Signs





 Vital Signs Past 12 Hours








  Date Time  Temp Pulse Resp B/P Pulse Ox O2 Delivery O2 Flow Rate FiO2


 


1/31/17 07:19 36.7 70 16 180/70 97 Nasal Cannula 2.0 





    195/72  Humidified Oxygen  


 


1/31/17 04:16 36.8 63 18 167/66 97 Nasal Cannula  





      Humidified Oxygen  


 


1/31/17 02:00    169/54    


 


1/31/17 00:31    175/69    


 


1/31/17 00:21 36.5 68 20 187/68 95   


 


1/31/17 00:00      Nasal Cannula 2.0 


 


1/30/17 23:05 36.6 67 18 169/70 96 Nasal Cannula  


 


1/30/17 20:56  64  179/72    


 


1/30/17 20:00  68 16 165/76 96 Nasal Cannula 2.0 





      Humidified Oxygen  











Notes


Mental Status:  alert / awake / arousable, participated in evaluation


Pt Amnestic to Procedure:  Yes


Nausea / Vomiting:  adequately controlled


Pain:  adequately controlled


Airway Patency, RR, SpO2:  stable & adequate


BP & HR:  stable & adequate


Hydration State:  stable & adequate


Anesthetic Complications:  no major complications apparent

## 2017-01-31 NOTE — HOSPITALIST PROGRESS NOTE
Hospitalist Progress Note


Date of Service


Jan 31, 2017.


 (Jacquelin Ballard ., PA-C)





Subjective


Pt evaluation today including:  conversation w/ patient, conversation w/ family 

( at bedside), physical exam, chart review, lab review, review of 

inpatient medication list


Voiding:  gomez catheter in place


The patient complained of some nausea this morning and did report 2 episodes of 

vomiting.  She received IV Zofran and states that it did resolve her nausea.  

She had some abdominal discomfort while she was nauseous, but that resolved 

after she vomited.  As a result of her symptoms, she did not eat much this 

morning.  She denies any dysphagia.  Patient complains of cough but states that 

it is consistent with her chronic cough.  The patient did have some 

hypertension overnight and this morning, started on hydralazine 5 mg IV q6h prn 

SBP >175 by Dr. Quinn overnight.  BP has improved but is persistently 

elevated.  The patient currently denies fevers, chills, sweats, chest pain, 

palpitations, claudication, wheezing, shortness of breath, abdominal pain, 

dysuria, hematuria, urinary retention, paralysis, weakness, numbness and 

tingling.





   Additional Comments:


See HPI for pertinent positives and negatives.  All other systems reviewed and 

negative.


 (Jacquelin Ballard ., PA-C)





Objective


Vital Signs











  Date Time  Temp Pulse Resp B/P Pulse Ox O2 Delivery O2 Flow Rate FiO2


 


1/31/17 10:48 36.8 66 18 154/71 93 Nasal Cannula 1.0 





      Humidified Oxygen  


 


1/31/17 08:13      Nasal Cannula 4.0 


 


1/31/17 07:19 36.7 70 16 180/70 97 Nasal Cannula 2.0 





    195/72  Humidified Oxygen  


 


1/31/17 04:16 36.8 63 18 167/66 97 Nasal Cannula  





      Humidified Oxygen  


 


1/31/17 02:00    169/54    


 


1/31/17 00:31    175/69    


 


1/31/17 00:21 36.5 68 20 187/68 95   


 


1/31/17 00:00      Nasal Cannula 2.0 


 


1/30/17 23:05 36.6 67 18 169/70 96 Nasal Cannula  


 


1/30/17 20:56  64  179/72    


 


1/30/17 20:00  68 16 165/76 96 Nasal Cannula 2.0 





      Humidified Oxygen  


 


1/30/17 19:17 36.5 62 16 180/71 97 Nasal Cannula 4.0 


 


1/30/17 15:25 36.4 61 16 167/67 95 Nasal Cannula 4.0 


 


1/30/17 15:25      Nasal Cannula 4.0 


 


1/30/17 14:25 36.3 55 17 148/70 96 Nasal Cannula 4.0 


 


1/30/17 13:25 36.6 60 16 145/86 93  4.0 


 


1/30/17 12:53     90 Nasal Cannula 4.0 


 


1/30/17 12:47  61 16 147/68 95  4.0 








 (Jacquelin Ballard ., PA-C)





Physical Exam


General Appearance:  WD/WN, no apparent distress, + obese


Eyes:  normal inspection, PERRL, EOMI


ENT:  normal ENT inspection, hearing grossly normal, pharynx normal, + 

pertinent finding (dry oral mucosa)


Neck:  supple, no JVD, trachea midline


Respiratory/Chest:  lungs clear, normal breath sounds, no respiratory distress


Cardiovascular:  regular rate, rhythm, no gallop, + systolic murmur


Abdomen:  normal bowel sounds, non tender, soft


Extremities:  non-tender, normal inspection, no pedal edema


Neurologic/Psychiatric:  alert, normal mood/affect, oriented x 3


Skin:  normal color, warm/dry, no rash


 (Jacquelin Ballard ., PA-C)





Laboratory Results





Last 24 Hours








Test


  1/30/17


14:01 1/30/17


16:51 1/30/17


20:43 1/31/17


06:18


 


White Blood Count 10.19 K/uL    13.19 K/uL 


 


Red Blood Count 3.27 M/uL    3.07 M/uL 


 


Hemoglobin 10.7 g/dL    10.1 g/dL 


 


Hematocrit 33.1 %    30.3 % 


 


Mean Corpuscular Volume 101.2 fL    98.7 fL 


 


Mean Corpuscular Hemoglobin 32.7 pg    32.9 pg 


 


Mean Corpuscular Hemoglobin


Concent 32.3 g/dl 


  


  


  33.3 g/dl 


 


 


Platelet Count 171 K/uL    206 K/uL 


 


Mean Platelet Volume 9.9 fL    9.8 fL 


 


Neutrophils (%) (Auto) 94.5 %    91.6 % 


 


Lymphocytes (%) (Auto) 3.6 %    2.2 % 


 


Monocytes (%) (Auto) 1.6 %    5.9 % 


 


Eosinophils (%) (Auto) 0.0 %    0.0 % 


 


Basophils (%) (Auto) 0.1 %    0.1 % 


 


Neutrophils # (Auto) 9.63 K/uL    12.08 K/uL 


 


Lymphocytes # (Auto) 0.37 K/uL    0.29 K/uL 


 


Monocytes # (Auto) 0.16 K/uL    0.78 K/uL 


 


Eosinophils # (Auto) 0.00 K/uL    0.00 K/uL 


 


Basophils # (Auto) 0.01 K/uL    0.01 K/uL 


 


RDW Standard Deviation 54.7 fL    53.2 fL 


 


RDW Coefficient of Variation 14.8 %    14.9 % 


 


Immature Granulocyte % (Auto) 0.2 %    0.2 % 


 


Immature Granulocyte # (Auto) 0.02 K/uL    0.03 K/uL 


 


Sodium Level 142 mmol/L    135 mmol/L 


 


Potassium Level 3.8 mmol/L    4.4 mmol/L 


 


Chloride Level 104 mmol/L    99 mmol/L 


 


Carbon Dioxide Level 28 mmol/L    25 mmol/L 


 


Anion Gap 10.0 mmol/L    11.0 mmol/L 


 


Blood Urea Nitrogen 27 mg/dl    24 mg/dl 


 


Creatinine 1.20 mg/dl    1.10 mg/dl 


 


Est Creatinine Clear Calc


Drug Dose 40.2 ml/min 


  


  


  43.8 ml/min 


 


 


Estimated GFR (


American) 49.1 


  


  


  54.5 


 


 


Estimated GFR (Non-


American 42.4 


  


  


  47.0 


 


 


BUN/Creatinine Ratio 22.8    21.9 


 


Random Glucose 192 mg/dl    162 mg/dl 


 


Estimated Average Glucose 131 mg/dl    


 


Hemoglobin A1c 6.2 %    


 


Calcium Level 8.4 mg/dl    8.2 mg/dl 


 


Bedside Glucose  184 mg/dl  176 mg/dl  














Test


  1/31/17


07:58 1/31/17


12:02 


  


 


 


Bedside Glucose 183 mg/dl  145 mg/dl   








 (Jacquelin Ballard, PA-C)





Assessment and Plan


80 y/o female with a history of CKD stage III, HTN, HLD, DM II, psoriatic 

arthritis, CAD and GERD who presents s/p L2-S1 decompression and fusion with 

Dr. Blunt on 1/30 for medical management. 


-Pain management, DVT prophylaxis, and PT/OT as per primary team


-Abdominal series x-ray ordered to assess for ileus


-If ileus, change to clear liquid diet





CKD stage III--stable.  Baseline creatinine 1.2


-Monitor with PRP, creatinine stable





HTN--pt has been consistently hypertensive


-Continue Lopressor 50 mg PO BID as pt has many periods of bradycardia


-Increase Norvasc to 10 mg PO qd


-Continue to cover with hydralazine 5 mg IV q6h prn SBP >175





HLD


-Continue rosuvastatin 20 mg PO qd





Diabetes mellitus type 2--Last HgbA1c checked 5/25/16 was 6.2.  Pt is diet 

controlled


-Insulin sliding scale


-Check BSGs q ac and qhs


-Rechecked HgbA1c on 1/30 was 6.2





Psoriatic arthritis


-Hold methotrexate, pt takes 12.5 mg PO once weekly


-Continue Prednisone 5 mg PO qd





GERD


-Continue esomeprazole 40 mg PO BID


-Continue famotidine 20 mg PO BID





Code Status


-Level I, FULL RESUSCITATION STATUS


 (Jacquelin Ballard ., PA-C)


Attending Attestation:


Pt seen/examined, chart reviewed, care plan d/w SHERRI Ballard.


I agree w/ the key components of her consult note.





Pt with nausea/emesis this am.  


No dysphagia.


No sob.  





VSS but with labile BPs


no fever


gen - NAD


mouth - MM dry but improved from yesterday


heart - RRR


lungs - fine dry rales bases (mild)


abd - distended, mildly tender upper quadrants, BS+


ext - no edema





labs -


Cr 1.1


CBC with Hb 10.1 and WBC 13





A/P:


1.  suspected ileus - x-rays ordered and c/w such.  


Clear liquids.  


Dulcolax suppos x 1.


Continue miralax as tolerated.  





2.  dysphagia - resolved.  





3.  T2DM - despite steroids FSBS controlled. 





4.  HTN - labile; agree w/ increase CCB. 





5.  insomnia - benadryl prn. 





RENETTA LEYVA MD


 (Jadiel Leyva MD)

## 2017-01-31 NOTE — PROGRESS NOTE
DATE: 01/31/2017

 

SUBJECTIVE:  Postop day 1.  Back pain is controlled.  Leg pain improved. 

Vital signs stable.  T-max 36.8.  SUE drained 170 mL over the last shift. 

Hematocrit this a.m. is stable at 30.3.  On exam she has good strength to

testing and is comfortable.

 

PLAN:  At this time, we are progressing slowly.  She is somewhat nauseated,

we will keep her in bed today which is sitting up at the bedside as long as

she tolerates this, we will increase therapy tomorrow.

## 2017-02-01 VITALS
OXYGEN SATURATION: 98 % | TEMPERATURE: 98.42 F | DIASTOLIC BLOOD PRESSURE: 75 MMHG | SYSTOLIC BLOOD PRESSURE: 152 MMHG | HEART RATE: 72 BPM

## 2017-02-01 VITALS — HEART RATE: 74 BPM | SYSTOLIC BLOOD PRESSURE: 142 MMHG | DIASTOLIC BLOOD PRESSURE: 48 MMHG

## 2017-02-01 VITALS
SYSTOLIC BLOOD PRESSURE: 120 MMHG | TEMPERATURE: 99.32 F | DIASTOLIC BLOOD PRESSURE: 52 MMHG | OXYGEN SATURATION: 93 % | HEART RATE: 81 BPM

## 2017-02-01 VITALS — SYSTOLIC BLOOD PRESSURE: 152 MMHG | HEART RATE: 68 BPM | DIASTOLIC BLOOD PRESSURE: 70 MMHG

## 2017-02-01 VITALS — HEART RATE: 73 BPM | DIASTOLIC BLOOD PRESSURE: 70 MMHG | SYSTOLIC BLOOD PRESSURE: 180 MMHG

## 2017-02-01 VITALS
HEART RATE: 60 BPM | DIASTOLIC BLOOD PRESSURE: 65 MMHG | SYSTOLIC BLOOD PRESSURE: 145 MMHG | OXYGEN SATURATION: 97 % | TEMPERATURE: 97.52 F

## 2017-02-01 VITALS — DIASTOLIC BLOOD PRESSURE: 64 MMHG | SYSTOLIC BLOOD PRESSURE: 183 MMHG

## 2017-02-01 VITALS
SYSTOLIC BLOOD PRESSURE: 176 MMHG | OXYGEN SATURATION: 99 % | DIASTOLIC BLOOD PRESSURE: 69 MMHG | HEART RATE: 62 BPM | TEMPERATURE: 97.52 F

## 2017-02-01 VITALS — OXYGEN SATURATION: 93 %

## 2017-02-01 LAB
ANION GAP SERPL CALC-SCNC: 7 MMOL/L (ref 3–11)
APPEARANCE UR: (no result)
BASOPHILS # BLD: 0.01 K/UL (ref 0–0.2)
BASOPHILS NFR BLD: 0.1 %
BILIRUB UR-MCNC: (no result) MG/DL
BUN SERPL-MCNC: 28 MG/DL (ref 7–18)
BUN/CREAT SERPL: 23.1 (ref 10–20)
CALCIUM SERPL-MCNC: 8.5 MG/DL (ref 8.5–10.1)
CHLORIDE SERPL-SCNC: 102 MMOL/L (ref 98–107)
CO2 SERPL-SCNC: 30 MMOL/L (ref 21–32)
COLOR UR: YELLOW
COMPLETE: YES
CREAT CL PREDICTED SERPL C-G-VRATE: 40.2 ML/MIN
CREAT SERPL-MCNC: 1.2 MG/DL (ref 0.6–1.2)
EOSINOPHIL NFR BLD AUTO: 206 K/UL (ref 130–400)
GLUCOSE SERPL-MCNC: 128 MG/DL (ref 70–99)
HCT VFR BLD CALC: 30.9 % (ref 37–47)
IG%: 0.3 %
IMM GRANULOCYTES NFR BLD AUTO: 4.2 %
LYMPHOCYTES # BLD: 0.6 K/UL (ref 1.2–3.4)
MAGNESIUM SERPL-MCNC: 2.4 MG/DL (ref 1.8–2.4)
MANUAL MICROSCOPIC REQUIRED?: NO
MCH RBC QN AUTO: 32.7 PG (ref 25–34)
MCHC RBC AUTO-ENTMCNC: 32.4 G/DL (ref 32–36)
MCV RBC AUTO: 101 FL (ref 80–100)
MONOCYTES NFR BLD: 12.5 %
NEUTROPHILS # BLD AUTO: 0 %
NEUTROPHILS NFR BLD AUTO: 82.9 %
NITRITE UR QL STRIP: (no result)
PH UR STRIP: 5 [PH] (ref 4.5–7.5)
PMV BLD AUTO: 10 FL (ref 7.4–10.4)
POTASSIUM SERPL-SCNC: 4.2 MMOL/L (ref 3.5–5.1)
RBC # BLD AUTO: 3.06 M/UL (ref 4.2–5.4)
REVIEW REQ?: NO
SODIUM SERPL-SCNC: 139 MMOL/L (ref 136–145)
SP GR UR STRIP: 1.02 (ref 1–1.03)
URINE BILL WITH OR WITHOUT MIC: (no result)
URINE EPITHELIAL CELL AUTO: >30 /LPF (ref 0–5)
UROBILINOGEN UR-MCNC: (no result) MG/DL
WBC # BLD AUTO: 14.44 K/UL (ref 4.8–10.8)

## 2017-02-01 RX ADMIN — CIPROFLOXACIN SCH MLS/HR: 2 INJECTION, SOLUTION INTRAVENOUS at 16:22

## 2017-02-01 RX ADMIN — METOPROLOL TARTRATE SCH MG: 50 TABLET, FILM COATED ORAL at 21:39

## 2017-02-01 RX ADMIN — PANTOPRAZOLE SCH MG: 40 TABLET, DELAYED RELEASE ORAL at 21:39

## 2017-02-01 RX ADMIN — SODIUM CHLORIDE SCH MLS/HR: 900 INJECTION, SOLUTION INTRAVENOUS at 13:14

## 2017-02-01 RX ADMIN — GABAPENTIN SCH MG: 100 CAPSULE ORAL at 21:39

## 2017-02-01 RX ADMIN — INSULIN ASPART SCH UNITS: 100 INJECTION, SOLUTION INTRAVENOUS; SUBCUTANEOUS at 09:23

## 2017-02-01 RX ADMIN — TRAMADOL HYDROCHLORIDE PRN MG: 50 TABLET, COATED ORAL at 21:38

## 2017-02-01 RX ADMIN — STANDARDIZED SENNA CONCENTRATE AND DOCUSATE SODIUM SCH TAB: 8.6; 5 TABLET ORAL at 21:39

## 2017-02-01 RX ADMIN — ROSUVASTATIN CALCIUM SCH MG: 20 TABLET, FILM COATED ORAL at 21:39

## 2017-02-01 RX ADMIN — Medication SCH MG: at 09:02

## 2017-02-01 RX ADMIN — ONDANSETRON PRN MG: 2 INJECTION INTRAMUSCULAR; INTRAVENOUS at 03:37

## 2017-02-01 RX ADMIN — ACETAMINOPHEN PRN MG: 500 TABLET, FILM COATED ORAL at 13:10

## 2017-02-01 RX ADMIN — AMLODIPINE BESYLATE SCH MG: 5 TABLET ORAL at 09:06

## 2017-02-01 RX ADMIN — TRAMADOL HYDROCHLORIDE PRN MG: 50 TABLET, COATED ORAL at 06:09

## 2017-02-01 RX ADMIN — PANTOPRAZOLE SCH MG: 40 TABLET, DELAYED RELEASE ORAL at 09:07

## 2017-02-01 RX ADMIN — INSULIN ASPART SCH UNITS: 100 INJECTION, SOLUTION INTRAVENOUS; SUBCUTANEOUS at 18:25

## 2017-02-01 RX ADMIN — INSULIN ASPART SCH UNITS: 100 INJECTION, SOLUTION INTRAVENOUS; SUBCUTANEOUS at 13:04

## 2017-02-01 RX ADMIN — METOPROLOL TARTRATE SCH MG: 50 TABLET, FILM COATED ORAL at 09:03

## 2017-02-01 RX ADMIN — GABAPENTIN SCH MG: 100 CAPSULE ORAL at 09:03

## 2017-02-01 RX ADMIN — INSULIN ASPART SCH UNITS: 100 INJECTION, SOLUTION INTRAVENOUS; SUBCUTANEOUS at 21:00

## 2017-02-01 NOTE — HOSPITALIST PROGRESS NOTE
Hospitalist Progress Note


Date of Service


Feb 1, 2017.


 (Jacquelin Ballard ., PA-C)





Subjective


Pt evaluation today including:  conversation w/ patient, physical exam, chart 

review, lab review, review of inpatient medication list


PO Intake:  Tolerating clear liquid diet


Voiding:  no voiding problems


Patient received IV Ativan last evening around 2200, and about an hour later 

she became very confused.  Patient got out of bed and pulled out her Kumar, 

both IVs, SUE drain, and took off her dressing.  Patient is still confused this 

morning and did not know what day it was and could not tell what time of day.  

Patient denies any complaints, but ROS may be unreliable due to mental status.  

Per nursing the patient did void on her own this morning and had a small bowel 

movement.  She has not had any nausea/vomiting today per nursing.  The patient 

denies fevers, chills, sweats, chest pain, palpitations, claudication, cough, 

wheezing, shortness of breath, nausea, vomiting, abdominal pain, dysuria, 

hematuria, urinary retention, paralysis, weakness, numbness and tingling.





   Additional Comments:


See HPI for pertinent positives and negatives.  All other systems reviewed and 

negative.  May be unreliable ROS due to mental status.


 (Jacquelin Ballard ., PA-C)





Objective


Vital Signs











  Date Time  Temp Pulse Resp B/P Pulse Ox O2 Delivery O2 Flow Rate FiO2


 


2/1/17 11:50 36.9 72 38 152/75 98 Nasal Cannula 3.0 


 


2/1/17 09:59   20     


 


2/1/17 08:58  74  142/48    


 


2/1/17 08:31     93 Nasal Cannula 3.0 


 


2/1/17 08:17 37.4 81 32 120/52 93 Nasal Cannula 3.0 


 


2/1/17 08:11      Nasal Cannula 2.0 


 


2/1/17 03:27  73  180/70    


 


2/1/17 03:15    183/64    


 


1/31/17 23:28 37.1 78 18 181/61 93 Nasal Cannula 2.0 


 


1/31/17 23:20      Nasal Cannula 2.0 





      Humidified Oxygen  


 


1/31/17 21:23  69  187/65    


 


1/31/17 21:08    194/71    


 


1/31/17 19:20      Nasal Cannula 2.0 





      Humidified Oxygen  


 


1/31/17 15:15 36.7 64 18 140/62 97 Nasal Cannula 1.0 








 (Jacquelin Ballard ., PA-C)





Physical Exam


General Appearance:  WD/WN, no apparent distress, + obese


Eyes:  normal inspection, EOMI, sclerae normal


ENT:  normal ENT inspection, hearing grossly normal, pharynx normal


Neck:  supple, no JVD, trachea midline


Respiratory/Chest:  lungs clear, normal breath sounds, no respiratory distress


Cardiovascular:  regular rate, rhythm, no gallop, + systolic murmur


Abdomen:  normal bowel sounds, non tender, soft


Extremities:  non-tender, normal inspection, no pedal edema


Neurologic/Psychiatric:  alert, normal mood/affect, + disoriented (oriented to 

person only)


Skin:  normal color, warm/dry, + rash


 (Jacquelin Ballard ., PA-C)





Laboratory Results





Last 24 Hours








Test


  1/31/17


16:54 1/31/17


20:44 2/1/17


07:09 2/1/17


08:07


 


Bedside Glucose 148 mg/dl  149 mg/dl   141 mg/dl 


 


White Blood Count   14.44 K/uL  


 


Red Blood Count   3.06 M/uL  


 


Hemoglobin   10.0 g/dL  


 


Hematocrit   30.9 %  


 


Mean Corpuscular Volume   101.0 fL  


 


Mean Corpuscular Hemoglobin   32.7 pg  


 


Mean Corpuscular Hemoglobin


Concent 


  


  32.4 g/dl 


  


 


 


Platelet Count   206 K/uL  


 


Mean Platelet Volume   10.0 fL  


 


Neutrophils (%) (Auto)   82.9 %  


 


Lymphocytes (%) (Auto)   4.2 %  


 


Monocytes (%) (Auto)   12.5 %  


 


Eosinophils (%) (Auto)   0.0 %  


 


Basophils (%) (Auto)   0.1 %  


 


Neutrophils # (Auto)   11.98 K/uL  


 


Lymphocytes # (Auto)   0.60 K/uL  


 


Monocytes # (Auto)   1.80 K/uL  


 


Eosinophils # (Auto)   0.00 K/uL  


 


Basophils # (Auto)   0.01 K/uL  


 


RDW Standard Deviation   55.8 fL  


 


RDW Coefficient of Variation   15.4 %  


 


Immature Granulocyte % (Auto)   0.3 %  


 


Immature Granulocyte # (Auto)   0.05 K/uL  


 


Sodium Level   139 mmol/L  


 


Potassium Level   4.2 mmol/L  


 


Chloride Level   102 mmol/L  


 


Carbon Dioxide Level   30 mmol/L  


 


Anion Gap   7.0 mmol/L  


 


Blood Urea Nitrogen   28 mg/dl  


 


Creatinine   1.20 mg/dl  


 


Est Creatinine Clear Calc


Drug Dose 


  


  40.2 ml/min 


  


 


 


Estimated GFR (


American) 


  


  49.1 


  


 


 


Estimated GFR (Non-


American 


  


  42.4 


  


 


 


BUN/Creatinine Ratio   23.1  


 


Random Glucose   128 mg/dl  


 


Calcium Level   8.5 mg/dl  


 


Magnesium Level   2.4 mg/dl  














Test


  2/1/17


11:15 


  


  


 


 


Bedside Glucose 189 mg/dl    








 (Jacquelin Ballard .TRANG)





Assessment and Plan


82 y/o female with a history of CKD stage III, HTN, HLD, DM II, psoriatic 

arthritis, CAD and GERD who presents s/p L2-S1 decompression and fusion with 

Dr. Blunt on 1/30 for medical management. 


-Pain management, DVT prophylaxis, and PT/OT as per primary team





Mild post-op ileus


-Chest/abdomen x-ray:  non-obstructed abdominal bowel gas pattern, mild gas 

distention of small bowel and colon may represent mild post op ileus.  Non-

obstructing right renal calculus.  Stable cardiomegaly.


-Clear liquid diet.  No nausea/vomiting today, small BM this morning


-NSS at 80 cc/hr





Confusion/delirium


-Avoid medications on Beer's criteria list


-D/C Ativan, hydroxyzine, metoclopramide, promethazine per Beer's criteria


-Check UA and urine culture for possible underlying UTI causing AMS





Leukocytosis


-WBC increased 2/1 to 14.44 from 13.19


-May be secondary to steroids, will check for infectious cause especially in 

setting of AMS





CKD stage III--stable.  Baseline creatinine 1.2


-Monitor with PRP, creatinine stable





HTN--pt has been consistently hypertensive


-Continue Lopressor 50 mg PO BID as pt has many periods of bradycardia


-Increase Norvasc to 10 mg PO qd


-Continue to cover with hydralazine 5 mg IV q6h prn SBP >175





HLD


-Continue rosuvastatin 20 mg PO qd





Diabetes mellitus type 2--Last HgbA1c checked 5/25/16 was 6.2.  Pt is diet 

controlled


-Insulin sliding scale


-Check BSGs q ac and qhs


-Rechecked HgbA1c on 1/30 was 6.2





Psoriatic arthritis


-Hold methotrexate, pt takes 12.5 mg PO once weekly


-Continue Prednisone 5 mg PO qd





GERD


-Continue esomeprazole 40 mg PO BID


-Continue famotidine 20 mg PO BID





Code Status


-Level I, FULL RESUSCITATION STATUS


 (Jacquelin Ballard PA-C)


Attending Attestation:


Pt seen/examined, chart reviewed, care plan d/w SHERRI Ballard.


I agree w/ the key components of her consult note.





Confused overnight; started following ativan administration.


During my visit she is less confused; in fact she remembers last pm to a 

degree. 


Still no significant flatus.  


No nausea, however, and tolerating clears. 





VSS except high BPs


no fever


gen - NAD


mouth - MM more moist today


heart - RRR


lungs - CTA b/l 


abd - distended - maybe slightly less than yesterday; NT; BS+


ext - no edema





labs -


Cr 1.2


CBC with WBC 14





A/P:


1.  ileus - ongoing.  Keep on clears.  Restart fluids. 


Ambulate as much as tolerated / allowed by primary team.  


Keep electrolytes normal.  


Avoid narcotics.  


maybe advance diet tomorrow 





2.  encephalopathy - toxic - 2nd to ativan.  Cannot rule out metabolic causes 

such as brewing UTI. 


No further benzos. 


send u/a and urine cx. 





3.  T2DM - improving.  Cont SSI novolog.  





4.  HTN - labile but should improve w/ discontinuation of the IV steroids.  











RENETTA LEYVA MD


 (Jadiel Leyva MD)

## 2017-02-01 NOTE — PROGRESS NOTE
DATE: 02/01/2017

 

HISTORY OF PRESENT ILLNESS:  She is still modestly confused but does know the

date, time and place which is prompting.  Her pain is controlled.  She denies

any leg pain.  Denies any nausea, vomiting or headaches.

 

PHYSICAL EXAMINATION:  Vital signs stable.  T-max 37.4.  SUE drained 30 mL

today.  Hematocrit stable at 30.9.  On exam, she has good strength to

testing.  Appears comfortable.

 

ASSESSMENT:  Status post multilevel lumbar decompression and fusion.

 

PLAN:  At this time, we will initiate physical therapy today, advance therapy

throughout the next few days and consider rehab.

## 2017-02-02 VITALS
DIASTOLIC BLOOD PRESSURE: 67 MMHG | TEMPERATURE: 99.14 F | OXYGEN SATURATION: 95 % | HEART RATE: 71 BPM | SYSTOLIC BLOOD PRESSURE: 144 MMHG

## 2017-02-02 VITALS
SYSTOLIC BLOOD PRESSURE: 160 MMHG | OXYGEN SATURATION: 96 % | DIASTOLIC BLOOD PRESSURE: 68 MMHG | TEMPERATURE: 98.06 F | HEART RATE: 64 BPM

## 2017-02-02 VITALS
OXYGEN SATURATION: 92 % | SYSTOLIC BLOOD PRESSURE: 146 MMHG | DIASTOLIC BLOOD PRESSURE: 64 MMHG | TEMPERATURE: 99.32 F | HEART RATE: 74 BPM

## 2017-02-02 VITALS — OXYGEN SATURATION: 93 %

## 2017-02-02 LAB
ANION GAP SERPL CALC-SCNC: 7 MMOL/L (ref 3–11)
BASOPHILS # BLD: 0 K/UL (ref 0–0.2)
BASOPHILS NFR BLD: 0 %
BUN SERPL-MCNC: 23 MG/DL (ref 7–18)
BUN/CREAT SERPL: 22.7 (ref 10–20)
CALCIUM SERPL-MCNC: 7.7 MG/DL (ref 8.5–10.1)
CHLORIDE SERPL-SCNC: 101 MMOL/L (ref 98–107)
CO2 SERPL-SCNC: 29 MMOL/L (ref 21–32)
COMPLETE: YES
CREAT CL PREDICTED SERPL C-G-VRATE: 48.2 ML/MIN
CREAT SERPL-MCNC: 1 MG/DL (ref 0.6–1.2)
EOSINOPHIL NFR BLD AUTO: 152 K/UL (ref 130–400)
GLUCOSE SERPL-MCNC: 104 MG/DL (ref 70–99)
HCT VFR BLD CALC: 25.4 % (ref 37–47)
HCT VFR BLD CALC: 27.4 % (ref 37–47)
HYPOCHROMIA BLD QL SMEAR: PRESENT
IG%: 0.3 %
IMM GRANULOCYTES NFR BLD AUTO: 8.6 %
LYMPHOCYTES # BLD: 0.69 K/UL (ref 1.2–3.4)
MACROCYTES BLD QL SMEAR: PRESENT
MCH RBC QN AUTO: 32.8 PG (ref 25–34)
MCHC RBC AUTO-ENTMCNC: 32.3 G/DL (ref 32–36)
MCV RBC AUTO: 101.6 FL (ref 80–100)
MONOCYTES NFR BLD: 8.9 %
NEUTROPHILS # BLD AUTO: 0.5 %
NEUTROPHILS NFR BLD AUTO: 81.7 %
PMV BLD AUTO: 10.1 FL (ref 7.4–10.4)
POTASSIUM SERPL-SCNC: 3.8 MMOL/L (ref 3.5–5.1)
RBC # BLD AUTO: 2.5 M/UL (ref 4.2–5.4)
SODIUM SERPL-SCNC: 137 MMOL/L (ref 136–145)
WBC # BLD AUTO: 7.98 K/UL (ref 4.8–10.8)

## 2017-02-02 RX ADMIN — METOPROLOL TARTRATE SCH MG: 50 TABLET, FILM COATED ORAL at 20:42

## 2017-02-02 RX ADMIN — Medication SCH MG: at 08:44

## 2017-02-02 RX ADMIN — GABAPENTIN SCH MG: 100 CAPSULE ORAL at 20:42

## 2017-02-02 RX ADMIN — PANTOPRAZOLE SCH MG: 40 TABLET, DELAYED RELEASE ORAL at 20:42

## 2017-02-02 RX ADMIN — INSULIN ASPART SCH UNITS: 100 INJECTION, SOLUTION INTRAVENOUS; SUBCUTANEOUS at 20:43

## 2017-02-02 RX ADMIN — INSULIN ASPART SCH UNITS: 100 INJECTION, SOLUTION INTRAVENOUS; SUBCUTANEOUS at 12:00

## 2017-02-02 RX ADMIN — INSULIN ASPART SCH UNITS: 100 INJECTION, SOLUTION INTRAVENOUS; SUBCUTANEOUS at 08:00

## 2017-02-02 RX ADMIN — GABAPENTIN SCH MG: 100 CAPSULE ORAL at 08:45

## 2017-02-02 RX ADMIN — HYDROCORTISONE SODIUM SUCCINATE SCH MLS/MIN: 100 INJECTION, POWDER, FOR SOLUTION INTRAMUSCULAR; INTRAVENOUS at 11:56

## 2017-02-02 RX ADMIN — STANDARDIZED SENNA CONCENTRATE AND DOCUSATE SODIUM SCH TAB: 8.6; 5 TABLET ORAL at 20:41

## 2017-02-02 RX ADMIN — CIPROFLOXACIN SCH MLS/HR: 2 INJECTION, SOLUTION INTRAVENOUS at 03:44

## 2017-02-02 RX ADMIN — HYDROCODONE BITARTRATE AND ACETAMINOPHEN PRN TAB: 5; 325 TABLET ORAL at 04:00

## 2017-02-02 RX ADMIN — AMLODIPINE BESYLATE SCH MG: 5 TABLET ORAL at 08:45

## 2017-02-02 RX ADMIN — METOPROLOL TARTRATE SCH MG: 50 TABLET, FILM COATED ORAL at 08:45

## 2017-02-02 RX ADMIN — ONDANSETRON PRN MG: 2 INJECTION INTRAMUSCULAR; INTRAVENOUS at 19:21

## 2017-02-02 RX ADMIN — PANTOPRAZOLE SCH MG: 40 TABLET, DELAYED RELEASE ORAL at 08:45

## 2017-02-02 RX ADMIN — SODIUM CHLORIDE SCH MLS/HR: 900 INJECTION, SOLUTION INTRAVENOUS at 12:29

## 2017-02-02 RX ADMIN — SODIUM CHLORIDE SCH MLS/HR: 900 INJECTION, SOLUTION INTRAVENOUS at 00:57

## 2017-02-02 RX ADMIN — ROSUVASTATIN CALCIUM SCH MG: 20 TABLET, FILM COATED ORAL at 20:42

## 2017-02-02 RX ADMIN — INSULIN ASPART SCH UNITS: 100 INJECTION, SOLUTION INTRAVENOUS; SUBCUTANEOUS at 17:38

## 2017-02-02 NOTE — PROGRESS NOTE
DATE: 02/02/2017

 

She is improving nicely.  Denies any nausea, vomiting or headaches.  She

tolerated physical therapy well yesterday.  Bowels working yesterday.  On

exam she has good strength to testing.  Is sitting upright and appears

comfortable.  Hematocrit this a.m. is 25.4. 

 

ASSESSMENT: Status post multilevel lumbar decompression and fusion. 

 

PLAN: At this time will continue physical therapy today.  Have consulted

 for possible [facility] placement tomorrow.

## 2017-02-02 NOTE — PHARMACY PROGRESS NOTE
Pharmacy Antibiotic Consult


Date of Service:


Feb 2, 2017.


Pharmacy Dosing Scope


Pharmacy is consulted to initiate Vancomycin IV dosing therapy, order 

appropriate labs and adjust drug dose/frequency.





Subjective


The patient is a 81 year old female admitted on Jan 30, 2017 at 07:00 with back 

and leg pain due to spinal stenosis. PMH significant for CKD stage 3, HTN, HLD, 

type 2 diabetes, CAD, and GERD. The patient is s/p LS-S1 decompression and 

fusion with Dr. Blunt on 1/30.





Objective


Height (Feet):  5


Height (Inches):  5


Weight (Kilograms):  87.600


Lab Results (24hrs):





Laboratory Tests








Test


  2/2/17


05:30


 


BUN/Creatinine Ratio 22.7 


 


Blood Urea Nitrogen 23 mg/dl 


 


Creatinine 1.00 mg/dl 


 


White Blood Count 7.98 K/uL 


 


Red Blood Count 2.50 M/uL 


 


Hemoglobin 8.2 g/dL 


 


Hematocrit 25.4 % 


 


Mean Corpuscular Volume 101.6 fL 


 


Mean Corpuscular Hemoglobin 32.8 pg 


 


Mean Corpuscular Hemoglobin


Concent 32.3 g/dl 


 


 


Platelet Count 152 K/uL 


 


Mean Platelet Volume 10.1 fL 


 


Neutrophils (%) (Auto) 81.7 % 


 


Lymphocytes (%) (Auto) 8.6 % 


 


Monocytes (%) (Auto) 8.9 % 


 


Eosinophils (%) (Auto) 0.5 % 


 


Basophils (%) (Auto) 0.0 % 


 


Neutrophils # (Auto) 6.52 K/uL 


 


Lymphocytes # (Auto) 0.69 K/uL 


 


Monocytes # (Auto) 0.71 K/uL 


 


Eosinophils # (Auto) 0.04 K/uL 


 


Basophils # (Auto) 0.00 K/uL 








Micro Results:











Item Value  Date Time


 


Urine Culture - Preliminary Resulted 2/1/17 1410





Urine , Clean Catch Streptococcus Species 


 


Urine Culture - Final Complete 1/10/17 0942





Urine , Clean Catch THREE TYPES OF ORGANSIMS PRESENT, ALL... 











Recent Pertinent Medications


Cipro 400 mg IV every 12 hours (2/1-2/2)





Assessment & Plan


81 year old female s/p LS-S1 decompression and fusion receiving empiric 

antibiotic therapy for complicated UTI. Antibiotics were changed from Cipro to 

Vancomycin for coverage of Enterococcus given history of E. faecalis UTI. 

Patient has penicillin allergy (hives, swelling).





Plan





Vancomycin IV dosing


* Loading dose: 1800 mg (20 mg/kg)


* Maintenance dose: 1400 mg (16 mg/kg) IV every 24 hours


* PK estimates; ke = 0.037 - 0.044 hr-1, T1/2 = 16 - 18 hours


* Selected conservative dosing interval due to advanced age and CKD III.


* Goal trough level estimate:  ~15 mcg/mL.


* Trough level has been ordered for:   2 / 5 / 17.





Pharmacy will continue to follow and will adjust dose/frequency as necessary.  

Thank you

## 2017-02-02 NOTE — HOSPITALIST PROGRESS NOTE
Hospitalist Progress Note


Date of Service


Feb 2, 2017.


 (Jacquelin Ballard ., PA-C)





Subjective


Pt evaluation today including:  conversation w/ patient, physical exam, chart 

review, lab review, review of inpatient medication list


PO Intake:  Tolerating clear liquids


Voiding:  no voiding problems


The patient repots feeling weak and fatigued and states she is not feeling 

well.  She is tolerating a clear liquid diet and has not had any more nausea or 

vomiting.  She is urinating without difficulty and did move her bowel 

yesterday.  The patient denies fevers, chills, sweats, chest pain, palpitations

, claudication, cough, wheezing, shortness of breath, nausea, vomiting, 

abdominal pain, dysuria, hematuria, urinary retention, paralysis, numbness and 

tingling.





   Additional Comments:


See HPI for pertinent positives and negatives.  All other systems reviewed and 

negative.


 (Jacquelin Ballard ., PA-C)





Objective


Vital Signs











  Date Time  Temp Pulse Resp B/P Pulse Ox O2 Delivery O2 Flow Rate FiO2


 


2/2/17 08:21     93 Room Air  


 


2/2/17 07:12 36.7 64 16 160/68 96 Nasal Cannula 3.0 





      Humidified Oxygen  


 


2/1/17 23:45      Nasal Cannula 2.0 


 


2/1/17 23:23 36.4 62 17 176/69 99 Nasal Cannula 3.0 





      Humidified Oxygen  


 


2/1/17 21:42  68  152/70    


 


2/1/17 16:00      Nasal Cannula 3.0 





      Humidified Oxygen  


 


2/1/17 15:26 36.4 60 17 145/65 97 Nasal Cannula 3.0 





      Humidified Oxygen  


 


2/1/17 12:42   22   Nasal Cannula 2.0 


 


2/1/17 11:50 36.9 72 38 152/75 98 Nasal Cannula 3.0 








 (Jacquelin Ballard ., PA-C)





Physical Exam


General Appearance:  WD/WN, no apparent distress, + obese


Eyes:  normal inspection, PERRL, EOMI


ENT:  normal ENT inspection, hearing grossly normal, pharynx normal


Neck:  supple, no JVD, trachea midline


Respiratory/Chest:  lungs clear, normal breath sounds, no respiratory distress


Cardiovascular:  regular rate, rhythm, no gallop, + systolic murmur


Abdomen:  normal bowel sounds, non tender, soft


Extremities:  non-tender, normal inspection, no pedal edema


Neurologic/Psychiatric:  alert, normal mood/affect, oriented x 3


Skin:  normal color, warm/dry, no rash


 (Jacquelin Ballard, TRANG)





Laboratory Results





Last 24 Hours








Test


  2/1/17


11:15 2/1/17


14:10 2/1/17


17:27 2/1/17


20:45


 


Bedside Glucose 189 mg/dl   145 mg/dl  162 mg/dl 


 


Urine Color  YELLOW   


 


Urine Appearance  CLOUDY   


 


Urine pH  5.0   


 


Urine Specific Gravity  1.017   


 


Urine Protein  1+   


 


Urine Glucose (UA)  TRACE   


 


Urine Ketones  NEG   


 


Urine Occult Blood  TRACE   


 


Urine Nitrite  NEG   


 


Urine Bilirubin  NEG   


 


Urine Urobilinogen  NEG   


 


Urine Leukocyte Esterase  TRACE   


 


Urine WBC (Auto)  1-5 /hpf   


 


Urine RBC (Auto)  0-4 /hpf   


 


Urine Hyaline Casts (Auto)  1-5 /lpf   


 


Urine Epithelial Cells (Auto)  >30 /lpf   


 


Urine Bacteria (Auto)  2+   














Test


  2/2/17


05:30 2/2/17


07:33 


  


 


 


White Blood Count 7.98 K/uL    


 


Red Blood Count 2.50 M/uL    


 


Hemoglobin 8.2 g/dL    


 


Hematocrit 25.4 %    


 


Mean Corpuscular Volume 101.6 fL    


 


Mean Corpuscular Hemoglobin 32.8 pg    


 


Mean Corpuscular Hemoglobin


Concent 32.3 g/dl 


  


  


  


 


 


Platelet Count 152 K/uL    


 


Mean Platelet Volume 10.1 fL    


 


Neutrophils (%) (Auto) 81.7 %    


 


Lymphocytes (%) (Auto) 8.6 %    


 


Monocytes (%) (Auto) 8.9 %    


 


Eosinophils (%) (Auto) 0.5 %    


 


Basophils (%) (Auto) 0.0 %    


 


Neutrophils # (Auto) 6.52 K/uL    


 


Lymphocytes # (Auto) 0.69 K/uL    


 


Monocytes # (Auto) 0.71 K/uL    


 


Eosinophils # (Auto) 0.04 K/uL    


 


Basophils # (Auto) 0.00 K/uL    


 


RDW Standard Deviation 55.7 fL    


 


RDW Coefficient of Variation 15.2 %    


 


Immature Granulocyte % (Auto) 0.3 %    


 


Immature Granulocyte # (Auto) 0.02 K/uL    


 


Hypochromasia PRESENT    


 


Macrocytosis PRESENT    


 


Sodium Level 137 mmol/L    


 


Potassium Level 3.8 mmol/L    


 


Chloride Level 101 mmol/L    


 


Carbon Dioxide Level 29 mmol/L    


 


Anion Gap 7.0 mmol/L    


 


Blood Urea Nitrogen 23 mg/dl    


 


Creatinine 1.00 mg/dl    


 


Est Creatinine Clear Calc


Drug Dose 48.2 ml/min 


  


  


  


 


 


Estimated GFR (


American) 61.2 


  


  


  


 


 


Estimated GFR (Non-


American 52.8 


  


  


  


 


 


BUN/Creatinine Ratio 22.7    


 


Random Glucose 104 mg/dl    


 


Calcium Level 7.7 mg/dl    


 


Bedside Glucose  105 mg/dl   








 (Jacquelin Ballard ., TRANG)





Assessment and Plan


82 y/o female with a history of CKD stage III, HTN, HLD, DM II, psoriatic 

arthritis, CAD and GERD who presents s/p L2-S1 decompression and fusion with 

Dr. Blunt on 1/30 for medical management. 


-Pain management, DVT prophylaxis, and PT/OT as per primary team





Mild post-op ileus


-Chest/abdomen x-ray:  non-obstructed abdominal bowel gas pattern, mild gas 

distention of small bowel and colon may represent mild post op ileus.  Non-

obstructing right renal calculus.  Stable cardiomegaly.


-Clear liquid diet.  No nausea/vomiting, small BMs 2/1.  Not passing much gas, 

frequent burping.


-NSS at 80 cc/hr


-Dulcolax suppository x 1





Confusion/delirium


-Avoid medications on Beer's criteria list


-D/C Ativan, hydroxyzine, metoclopramide, promethazine per Beer's criteria





UTI--possibly Gomez associated


-Check UA and urine culture for possible underlying UTI causing AMS


-UA 2+ bacteria, trace blood and leuks


-Urine culture positive for streptococcus, sensitivities to follow


-Empirically treated with Cipro 400 mg IV BID, will D/C due to Gram positive 

cocci


-Start vancomycin as pt has h/o enterococcus in the past





Acute blood loss anemia in postoperative setting


-Hgb had been stable in 10s postoperatively


-Hgb on 2/2 down to 8.2


-Recheck H&H at 1600


-Transfuse if Hgb <8





Leukocytosis--resolved


-WBC increased 2/1 to 14.44 from 13.19


-WBC improved to 7.98 on 2/2


-May be secondary to steroids, infectious source





CKD stage III--stable.  Baseline creatinine 1.2


-Monitor with PRP, creatinine stable





HTN--pt has been consistently hypertensive


-Continue Lopressor 50 mg PO BID as pt has many periods of bradycardia


-Increase Norvasc to 10 mg PO qd


-Continue to cover with hydralazine 5 mg IV q6h prn SBP >175





HLD


-Continue rosuvastatin 20 mg PO qd





Diabetes mellitus type 2--Last HgbA1c checked 5/25/16 was 6.2.  Pt is diet 

controlled


-Insulin sliding scale


-Check BSGs q ac and qhs


-Rechecked HgbA1c on 1/30 was 6.2





Psoriatic arthritis


-Hold methotrexate, pt takes 12.5 mg PO once weekly


-Continue Prednisone 5 mg PO qd





GERD


-Continue esomeprazole 40 mg PO BID


-Continue famotidine 20 mg PO BID





Code Status


-Level I, FULL RESUSCITATION STATUS


 (Jacquelin Ballard ., PA-C)


Attending Attestation:


Pt seen/examined, chart reviewed, care plan d/w SHERRI Ballard.


I agree w/ the key components of her consult note.





Confusion resolved.


But patient "just feels bad."


+fatigue, lots of burping, little flatus, abd bloating.


No sob or cough. 


No vomiting. 





VSS; BPs improved 


no fever


gen - looks ill but NAD


psych - a/o x 3


mouth - MMM


heart - RRR


lungs - CTA b/l 


abd - distended but nontender, BS+, no mass


ext - no edema





labs -


Cr 1


CBC with WBC 8


urine cx with >100,000 CFU strep





A/P:


1.  ileus - no improvement. 


Keep on clears; no advancement. 


Cont IVF.


Ambulate.


Rx the UTI.


Try dulcolax suppos x 1 again.





2.  encephalopathy - toxic and metabolic - resolved.  





3.  strep UTI - d/c cipro, change to vanco.


UTI may be complicated / gomez-catheter associated. 





4.  HTN - improved.





5.  chronic steroid dependency due to psoriatic arthritis - would benefit from 

restarting some stress dose steroids.


hold prednisone


start hydrocortisone 25mg BID IV.





 updated 








RENETTA LEYVA MD


 (Jadiel Leyva MD)

## 2017-02-03 VITALS
SYSTOLIC BLOOD PRESSURE: 146 MMHG | HEART RATE: 67 BPM | DIASTOLIC BLOOD PRESSURE: 70 MMHG | OXYGEN SATURATION: 90 % | TEMPERATURE: 98.24 F

## 2017-02-03 VITALS
OXYGEN SATURATION: 91 % | TEMPERATURE: 99.32 F | SYSTOLIC BLOOD PRESSURE: 169 MMHG | HEART RATE: 68 BPM | DIASTOLIC BLOOD PRESSURE: 91 MMHG

## 2017-02-03 VITALS
SYSTOLIC BLOOD PRESSURE: 160 MMHG | HEART RATE: 70 BPM | DIASTOLIC BLOOD PRESSURE: 70 MMHG | TEMPERATURE: 98.06 F | OXYGEN SATURATION: 92 %

## 2017-02-03 VITALS
OXYGEN SATURATION: 93 % | SYSTOLIC BLOOD PRESSURE: 160 MMHG | TEMPERATURE: 98.6 F | DIASTOLIC BLOOD PRESSURE: 64 MMHG | HEART RATE: 68 BPM

## 2017-02-03 VITALS
SYSTOLIC BLOOD PRESSURE: 183 MMHG | OXYGEN SATURATION: 91 % | DIASTOLIC BLOOD PRESSURE: 70 MMHG | TEMPERATURE: 99.68 F | HEART RATE: 74 BPM

## 2017-02-03 VITALS — OXYGEN SATURATION: 91 %

## 2017-02-03 VITALS
SYSTOLIC BLOOD PRESSURE: 169 MMHG | DIASTOLIC BLOOD PRESSURE: 91 MMHG | OXYGEN SATURATION: 91 % | HEART RATE: 68 BPM | TEMPERATURE: 99.32 F

## 2017-02-03 VITALS
TEMPERATURE: 98.96 F | DIASTOLIC BLOOD PRESSURE: 68 MMHG | HEART RATE: 71 BPM | SYSTOLIC BLOOD PRESSURE: 162 MMHG | OXYGEN SATURATION: 94 %

## 2017-02-03 VITALS
DIASTOLIC BLOOD PRESSURE: 65 MMHG | OXYGEN SATURATION: 92 % | TEMPERATURE: 98.6 F | HEART RATE: 67 BPM | SYSTOLIC BLOOD PRESSURE: 169 MMHG

## 2017-02-03 VITALS
SYSTOLIC BLOOD PRESSURE: 161 MMHG | TEMPERATURE: 98.78 F | OXYGEN SATURATION: 91 % | HEART RATE: 72 BPM | DIASTOLIC BLOOD PRESSURE: 64 MMHG

## 2017-02-03 VITALS — TEMPERATURE: 98.42 F | SYSTOLIC BLOOD PRESSURE: 167 MMHG | HEART RATE: 66 BPM | DIASTOLIC BLOOD PRESSURE: 63 MMHG

## 2017-02-03 VITALS
DIASTOLIC BLOOD PRESSURE: 64 MMHG | TEMPERATURE: 98.78 F | HEART RATE: 71 BPM | OXYGEN SATURATION: 92 % | SYSTOLIC BLOOD PRESSURE: 161 MMHG

## 2017-02-03 VITALS
HEART RATE: 59 BPM | TEMPERATURE: 97.7 F | SYSTOLIC BLOOD PRESSURE: 157 MMHG | DIASTOLIC BLOOD PRESSURE: 75 MMHG | OXYGEN SATURATION: 96 %

## 2017-02-03 VITALS
SYSTOLIC BLOOD PRESSURE: 163 MMHG | HEART RATE: 72 BPM | DIASTOLIC BLOOD PRESSURE: 71 MMHG | OXYGEN SATURATION: 91 % | TEMPERATURE: 99.5 F

## 2017-02-03 VITALS — HEART RATE: 75 BPM | TEMPERATURE: 99.32 F | DIASTOLIC BLOOD PRESSURE: 64 MMHG | SYSTOLIC BLOOD PRESSURE: 157 MMHG

## 2017-02-03 VITALS — OXYGEN SATURATION: 90 %

## 2017-02-03 VITALS — SYSTOLIC BLOOD PRESSURE: 152 MMHG | HEART RATE: 74 BPM | TEMPERATURE: 98.78 F | DIASTOLIC BLOOD PRESSURE: 63 MMHG

## 2017-02-03 VITALS — SYSTOLIC BLOOD PRESSURE: 152 MMHG | DIASTOLIC BLOOD PRESSURE: 69 MMHG

## 2017-02-03 LAB
ANION GAP SERPL CALC-SCNC: 8 MMOL/L (ref 3–11)
BASOPHILS # BLD: 0 K/UL (ref 0–0.2)
BASOPHILS NFR BLD: 0 %
BUN SERPL-MCNC: 20 MG/DL (ref 7–18)
BUN/CREAT SERPL: 19.9 (ref 10–20)
CALCIUM SERPL-MCNC: 7.5 MG/DL (ref 8.5–10.1)
CHLORIDE SERPL-SCNC: 102 MMOL/L (ref 98–107)
CO2 SERPL-SCNC: 26 MMOL/L (ref 21–32)
COMPLETE: YES
CREAT CL PREDICTED SERPL C-G-VRATE: 48.2 ML/MIN
CREAT SERPL-MCNC: 1 MG/DL (ref 0.6–1.2)
EOSINOPHIL NFR BLD AUTO: 153 K/UL (ref 130–400)
GLUCOSE SERPL-MCNC: 119 MG/DL (ref 70–99)
HCT VFR BLD CALC: 23.1 % (ref 37–47)
HCT VFR BLD CALC: 33 % (ref 37–47)
IG%: 0.3 %
IMM GRANULOCYTES NFR BLD AUTO: 7.8 %
LYMPHOCYTES # BLD: 0.52 K/UL (ref 1.2–3.4)
MACROCYTES BLD QL SMEAR: PRESENT
MCH RBC QN AUTO: 32.5 PG (ref 25–34)
MCHC RBC AUTO-ENTMCNC: 32 G/DL (ref 32–36)
MCV RBC AUTO: 101.3 FL (ref 80–100)
MONOCYTES NFR BLD: 12.5 %
NEUTROPHILS # BLD AUTO: 0.5 %
NEUTROPHILS NFR BLD AUTO: 78.9 %
PMV BLD AUTO: 9.9 FL (ref 7.4–10.4)
POTASSIUM SERPL-SCNC: 4.1 MMOL/L (ref 3.5–5.1)
RBC # BLD AUTO: 2.28 M/UL (ref 4.2–5.4)
SODIUM SERPL-SCNC: 136 MMOL/L (ref 136–145)
WBC # BLD AUTO: 6.63 K/UL (ref 4.8–10.8)

## 2017-02-03 RX ADMIN — Medication SCH MG: at 08:42

## 2017-02-03 RX ADMIN — METOPROLOL TARTRATE SCH MG: 50 TABLET, FILM COATED ORAL at 08:42

## 2017-02-03 RX ADMIN — STANDARDIZED SENNA CONCENTRATE AND DOCUSATE SODIUM SCH TAB: 8.6; 5 TABLET ORAL at 20:59

## 2017-02-03 RX ADMIN — INSULIN ASPART SCH UNITS: 100 INJECTION, SOLUTION INTRAVENOUS; SUBCUTANEOUS at 12:37

## 2017-02-03 RX ADMIN — INSULIN ASPART SCH UNITS: 100 INJECTION, SOLUTION INTRAVENOUS; SUBCUTANEOUS at 20:59

## 2017-02-03 RX ADMIN — INSULIN ASPART SCH UNITS: 100 INJECTION, SOLUTION INTRAVENOUS; SUBCUTANEOUS at 17:15

## 2017-02-03 RX ADMIN — SODIUM CHLORIDE SCH MLS/HR: 900 INJECTION, SOLUTION INTRAVENOUS at 13:23

## 2017-02-03 RX ADMIN — GABAPENTIN SCH MG: 100 CAPSULE ORAL at 08:42

## 2017-02-03 RX ADMIN — METOPROLOL TARTRATE SCH MG: 50 TABLET, FILM COATED ORAL at 20:59

## 2017-02-03 RX ADMIN — SODIUM CHLORIDE SCH MLS/HR: 900 INJECTION, SOLUTION INTRAVENOUS at 23:55

## 2017-02-03 RX ADMIN — SODIUM CHLORIDE SCH MLS/HR: 900 INJECTION, SOLUTION INTRAVENOUS at 00:24

## 2017-02-03 RX ADMIN — AMLODIPINE BESYLATE SCH MG: 5 TABLET ORAL at 08:42

## 2017-02-03 RX ADMIN — INSULIN ASPART SCH UNITS: 100 INJECTION, SOLUTION INTRAVENOUS; SUBCUTANEOUS at 08:41

## 2017-02-03 RX ADMIN — HYDROCORTISONE SODIUM SUCCINATE SCH MLS/MIN: 100 INJECTION, POWDER, FOR SOLUTION INTRAMUSCULAR; INTRAVENOUS at 11:22

## 2017-02-03 RX ADMIN — PANTOPRAZOLE SCH MG: 40 TABLET, DELAYED RELEASE ORAL at 20:58

## 2017-02-03 RX ADMIN — HYDROCORTISONE SODIUM SUCCINATE SCH MLS/MIN: 100 INJECTION, POWDER, FOR SOLUTION INTRAMUSCULAR; INTRAVENOUS at 00:23

## 2017-02-03 RX ADMIN — PANTOPRAZOLE SCH MG: 40 TABLET, DELAYED RELEASE ORAL at 08:42

## 2017-02-03 RX ADMIN — ROSUVASTATIN CALCIUM SCH MG: 20 TABLET, FILM COATED ORAL at 20:58

## 2017-02-03 RX ADMIN — ACETAMINOPHEN PRN MG: 500 TABLET, FILM COATED ORAL at 17:56

## 2017-02-03 RX ADMIN — VANCOMYCIN HYDROCHLORIDE SCH MLS/HR: 1 INJECTION, POWDER, LYOPHILIZED, FOR SOLUTION INTRAVENOUS at 04:01

## 2017-02-03 RX ADMIN — GABAPENTIN SCH MG: 100 CAPSULE ORAL at 20:58

## 2017-02-03 RX ADMIN — HYDROCORTISONE SODIUM SUCCINATE SCH MLS/MIN: 100 INJECTION, POWDER, FOR SOLUTION INTRAMUSCULAR; INTRAVENOUS at 23:52

## 2017-02-03 NOTE — PROGRESS NOTE
DATE: 02/03/2017

 

Postop day 4.  The patient states her back pain is well controlled.  Denies

any numbness, tingling, or lower extremity pain.  She is ambulating to the

bathroom only.  She states this is not difficult and she has no pain when

doing so.  She does feel tired today.  Denies shortness of breath.  Vital

signs stable.  T-max 36.8.  Hematocrit is 23.1.

 

PHYSICAL EXAMINATION:  She has excellent strength to testing.  Sensory is

symmetric and intact.

 

ASSESSMENT:  Status post multilevel lumbar decompression and fusion.

 

PLAN:  At this time, we will transfuse her with 2 units.  We will reassess

her in a.m. and would like to initiate more formalized physical therapy

tomorrow.  I anticipate possible transfer to [facility] tomorrow.

## 2017-02-03 NOTE — HOSPITALIST PROGRESS NOTE
Hospitalist Progress Note


Date of Service


Feb 3, 2017.


 (Jacquelin Ballard ., PA-C)





Subjective


Pt evaluation today including:  conversation w/ patient, physical exam, chart 

review, lab review, review of inpatient medication list


PO Intake:  Tolerating clear liquid diet


Voiding:  no voiding problems


The patient reports feeling okay.  She still complains of weakness and fatigue.

  She is tolerating a clear liquid diet, but she is still not passing much 

flatus.  She is still burping often. She denies any bowel movements today or 

yesterday.  The patient denies fevers, chills, sweats, chest pain, palpitations

, claudication, cough, wheezing, shortness of breath, nausea, vomiting, 

abdominal pain, dysuria, hematuria, urinary retention, paralysis, numbness and 

tingling.





   Additional Comments:


See HPI for pertinent positives and negatives.  All other systems reviewed and 

negative.


 (Jacquelin Ballard, PA-C)





Objective


Vital Signs











  Date Time  Temp Pulse Resp B/P Pulse Ox O2 Delivery O2 Flow Rate FiO2


 


2/3/17 08:22     90 Room Air  


 


2/3/17 08:11 36.8 67 16 146/70 90 Room Air  


 


2/3/17 07:38     90 Room Air  


 


2/3/17 00:25      Nasal Cannula 2.0 


 


2/2/17 22:55 37.4 74 18 146/64 92 Nasal Cannula 3.0 





      Humidified Oxygen  


 


2/2/17 16:05      Room Air  


 


2/2/17 15:36 37.3 71 18 144/67 95 Room Air  








 (Jacquelin Ballard, PA-C)





Physical Exam


General Appearance:  WD/WN, no apparent distress, + obese


Eyes:  normal inspection, PERRL, EOMI


ENT:  normal ENT inspection, hearing grossly normal, pharynx normal, + 

pertinent finding (dry oral mucosa)


Neck:  supple, no JVD, trachea midline


Respiratory/Chest:  lungs clear, normal breath sounds, no respiratory distress, 

+ decreased breath sounds


Cardiovascular:  regular rate, rhythm, no gallop, + systolic murmur


Abdomen:  normal bowel sounds, soft, + tenderness (mild diffuse tenderness)


Extremities:  non-tender, normal inspection, no pedal edema


Neurologic/Psychiatric:  alert, normal mood/affect, oriented x 3


Skin:  normal color, warm/dry, no rash


 (Jacquelin Ballard ., PA-C)





Laboratory Results





Last 24 Hours








Test


  2/2/17


12:00 2/2/17


15:55 2/2/17


16:38 2/2/17


20:38


 


Bedside Glucose 133 mg/dl   194 mg/dl  135 mg/dl 


 


Hemoglobin  8.9 g/dL   


 


Hematocrit  27.4 %   














Test


  2/3/17


06:20 2/3/17


08:11 


  


 


 


White Blood Count 6.63 K/uL    


 


Red Blood Count 2.28 M/uL    


 


Hemoglobin 7.4 g/dL    


 


Hematocrit 23.1 %    


 


Mean Corpuscular Volume 101.3 fL    


 


Mean Corpuscular Hemoglobin 32.5 pg    


 


Mean Corpuscular Hemoglobin


Concent 32.0 g/dl 


  


  


  


 


 


Platelet Count 153 K/uL    


 


Mean Platelet Volume 9.9 fL    


 


Neutrophils (%) (Auto) 78.9 %    


 


Lymphocytes (%) (Auto) 7.8 %    


 


Monocytes (%) (Auto) 12.5 %    


 


Eosinophils (%) (Auto) 0.5 %    


 


Basophils (%) (Auto) 0.0 %    


 


Neutrophils # (Auto) 5.23 K/uL    


 


Lymphocytes # (Auto) 0.52 K/uL    


 


Monocytes # (Auto) 0.83 K/uL    


 


Eosinophils # (Auto) 0.03 K/uL    


 


Basophils # (Auto) 0.00 K/uL    


 


RDW Standard Deviation 56.0 fL    


 


RDW Coefficient of Variation 15.3 %    


 


Immature Granulocyte % (Auto) 0.3 %    


 


Immature Granulocyte # (Auto) 0.02 K/uL    


 


Macrocytosis PRESENT    


 


Sodium Level 136 mmol/L    


 


Potassium Level 4.1 mmol/L    


 


Chloride Level 102 mmol/L    


 


Carbon Dioxide Level 26 mmol/L    


 


Anion Gap 8.0 mmol/L    


 


Blood Urea Nitrogen 20 mg/dl    


 


Creatinine 1.00 mg/dl    


 


Est Creatinine Clear Calc


Drug Dose 48.2 ml/min 


  


  


  


 


 


Estimated GFR (


American) 61.2 


  


  


  


 


 


Estimated GFR (Non-


American 52.8 


  


  


  


 


 


BUN/Creatinine Ratio 19.9    


 


Random Glucose 119 mg/dl    


 


Calcium Level 7.5 mg/dl    


 


Bedside Glucose  122 mg/dl   








 (Jacquelin Ballard, TRANG)





Assessment and Plan


80 y/o female with a history of CKD stage III, HTN, HLD, DM II, psoriatic 

arthritis, CAD and GERD who presents s/p L2-S1 decompression and fusion with 

Dr. Blunt on 1/30 for medical management. 


-Pain management, DVT prophylaxis, and PT/OT as per primary team





Mild post-op ileus


-Chest/abdomen x-ray:  non-obstructed abdominal bowel gas pattern, mild gas 

distention of small bowel and colon may represent mild post op ileus.  Non-

obstructing right renal calculus.  Stable cardiomegaly.


-Clear liquid diet.  No nausea/vomiting, small BMs 2/1.  Not passing much gas, 

frequent burping.


-NSS at 80 cc/hr


-Dulcolax suppository x 1 on 2/2 with no change


-Dulcolax 5 mg PO x 1 on 2/3





Confusion/delirium


-Avoid medications on Beer's criteria list


-D/C Ativan, hydroxyzine, metoclopramide, promethazine per Beer's criteria





UTI--possibly Kumar associated


-Check UA and urine culture for possible underlying UTI causing AMS


-UA 2+ bacteria, trace blood and leuks


-Urine culture positive for streptococcus, sensitivities to follow


-Empirically treated with Cipro 400 mg IV BID, will D/C due to Gram positive 

cocci


-Start vancomycin on 2/2 as pt has h/o enterococcus in the past





Acute blood loss anemia in postoperative setting


-Hgb had been stable in 10s postoperatively


-Hgb on 2/2 down to 8.2


-Hgb on 2/3 7.4, will transfuse 2 units.  Possible hematoma?


-Recheck H&H at 1700 after blood


-Lasix 20 mg IV x 1 after 2 units are in








Leukocytosis--resolved


-WBC increased 2/1 to 14.44 from 13.19


-WBC improved to 7.98 on 2/2.  Remains stable.


-May be secondary to steroids, infectious source





CKD stage III--stable.  Baseline creatinine 1.2


-Monitor with PRP, creatinine stable





HTN--pt has been consistently hypertensive


-Continue Lopressor 50 mg PO BID as pt has many periods of bradycardia


-Increase Norvasc to 10 mg PO qd


-Continue to cover with hydralazine 5 mg IV q6h prn SBP >175





HLD


-Continue rosuvastatin 20 mg PO qd





Diabetes mellitus type 2--Last HgbA1c checked 5/25/16 was 6.2.  Pt is diet 

controlled


-Insulin sliding scale


-Check BSGs q ac and qhs


-Rechecked HgbA1c on 1/30 was 6.2





Psoriatic arthritis


-Hold methotrexate, pt takes 12.5 mg PO once weekly on Sundays


-Hold Prednisone 5 mg PO qd for stress dose steroids


-Hydrocortisone 25 mg IV q12h, can wean tomorrow





GERD


-Continue esomeprazole 40 mg PO BID


-Continue famotidine 20 mg PO BID





Code Status


-Level I, FULL RESUSCITATION STATUS


 (Jacquelin Ballard ., PAGLENIS)


Attending Attestation:


Pt seen/examined, chart reviewed, care plan d/w PA Jacquelin Elio.


I agree w/ the key components of her consult note.





Feels tired.


Tolerating clears. 


Scant flatus; no BM either.  


No nausea/emesis however.  


Very little walking per staff. 





VSS, no fever


gen - NAD


skin - pallor


psych - a/o x 3


mouth - MMM


heart - RRR


lungs - CTA b/l but slightly decreased bases


abd - distended - no change from prior exams; BS+, NT, no HSM


ext - no edema





labs -


Cr 1


Hb 7.4


CBC with WBC 6


urine cx with >100,000 CFU enterococcus 





A/P:


1.  ileus - again no improvement. 


Keep on clears; no advancement. 


Cont IVF.


Ambulate as MUCH AS POSSIBLE.


Recheck K, mag, phos in am.  


Try dulcolax by mouth x 1. 


Rx the UTI.


Avoid narcotics if possible.  


Repeat films tomorrow if not improving. 





2.  encephalopathy - toxic and metabolic - resolved.  





3.  enterococcal UTI - cont the vanco IV 1 more day due to poor oral intake/

ileus. 





4.  HTN - improved.





5.  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 

25mg BID IV.


wean tomorrow.





6.  acute blood loss anemia - Tx 2 units PRBCs; lasix 20mg x 1 after.


CBC am.





 updated 





Ms. Ballard spoke with Dr. Blunt today re: plan of care.








RENETTA LEYVA MD


 (Jadiel Leyva MD)

## 2017-02-04 VITALS
OXYGEN SATURATION: 96 % | DIASTOLIC BLOOD PRESSURE: 75 MMHG | TEMPERATURE: 97.7 F | HEART RATE: 59 BPM | SYSTOLIC BLOOD PRESSURE: 157 MMHG

## 2017-02-04 VITALS — DIASTOLIC BLOOD PRESSURE: 68 MMHG | SYSTOLIC BLOOD PRESSURE: 173 MMHG | HEART RATE: 72 BPM

## 2017-02-04 VITALS
OXYGEN SATURATION: 95 % | DIASTOLIC BLOOD PRESSURE: 71 MMHG | HEART RATE: 62 BPM | TEMPERATURE: 98.24 F | SYSTOLIC BLOOD PRESSURE: 161 MMHG

## 2017-02-04 VITALS
SYSTOLIC BLOOD PRESSURE: 159 MMHG | DIASTOLIC BLOOD PRESSURE: 71 MMHG | TEMPERATURE: 98.24 F | OXYGEN SATURATION: 95 % | HEART RATE: 63 BPM

## 2017-02-04 VITALS — SYSTOLIC BLOOD PRESSURE: 152 MMHG | DIASTOLIC BLOOD PRESSURE: 71 MMHG

## 2017-02-04 VITALS — SYSTOLIC BLOOD PRESSURE: 178 MMHG | DIASTOLIC BLOOD PRESSURE: 72 MMHG

## 2017-02-04 LAB
ANION GAP SERPL CALC-SCNC: 7 MMOL/L (ref 3–11)
BASOPHILS # BLD: 0.01 K/UL (ref 0–0.2)
BASOPHILS NFR BLD: 0.1 %
BUN SERPL-MCNC: 18 MG/DL (ref 7–18)
BUN/CREAT SERPL: 16.3 (ref 10–20)
CALCIUM SERPL-MCNC: 7.7 MG/DL (ref 8.5–10.1)
CHLORIDE SERPL-SCNC: 103 MMOL/L (ref 98–107)
CO2 SERPL-SCNC: 30 MMOL/L (ref 21–32)
COMPLETE: YES
CREAT CL PREDICTED SERPL C-G-VRATE: 43.8 ML/MIN
CREAT SERPL-MCNC: 1.1 MG/DL (ref 0.6–1.2)
EOSINOPHIL NFR BLD AUTO: 174 K/UL (ref 130–400)
GLUCOSE SERPL-MCNC: 101 MG/DL (ref 70–99)
HCT VFR BLD CALC: 32.4 % (ref 37–47)
IG%: 0.5 %
IMM GRANULOCYTES NFR BLD AUTO: 5.8 %
LYMPHOCYTES # BLD: 0.46 K/UL (ref 1.2–3.4)
MAGNESIUM SERPL-MCNC: 2.2 MG/DL (ref 1.8–2.4)
MCH RBC QN AUTO: 30.8 PG (ref 25–34)
MCHC RBC AUTO-ENTMCNC: 31.8 G/DL (ref 32–36)
MCV RBC AUTO: 97 FL (ref 80–100)
MONOCYTES NFR BLD: 10.9 %
NEUTROPHILS # BLD AUTO: 0.9 %
NEUTROPHILS NFR BLD AUTO: 81.8 %
PHOSPHATE SERPL-MCNC: 2.9 MG/DL (ref 2.5–4.9)
PMV BLD AUTO: 9.8 FL (ref 7.4–10.4)
POTASSIUM SERPL-SCNC: 3.9 MMOL/L (ref 3.5–5.1)
RBC # BLD AUTO: 3.34 M/UL (ref 4.2–5.4)
SODIUM SERPL-SCNC: 140 MMOL/L (ref 136–145)
WBC # BLD AUTO: 7.98 K/UL (ref 4.8–10.8)

## 2017-02-04 RX ADMIN — NYSTATIN SCH ML: 100000 SUSPENSION ORAL at 15:49

## 2017-02-04 RX ADMIN — GUAIFENESIN SCH MG: 600 TABLET, EXTENDED RELEASE ORAL at 20:54

## 2017-02-04 RX ADMIN — AMLODIPINE BESYLATE SCH MG: 5 TABLET ORAL at 08:40

## 2017-02-04 RX ADMIN — METOPROLOL TARTRATE SCH MG: 50 TABLET, FILM COATED ORAL at 08:40

## 2017-02-04 RX ADMIN — PANTOPRAZOLE SCH MG: 40 TABLET, DELAYED RELEASE ORAL at 20:53

## 2017-02-04 RX ADMIN — HYDROCODONE BITARTRATE AND ACETAMINOPHEN PRN TAB: 5; 325 TABLET ORAL at 07:19

## 2017-02-04 RX ADMIN — GABAPENTIN SCH MG: 100 CAPSULE ORAL at 20:53

## 2017-02-04 RX ADMIN — GUAIFENESIN SCH MG: 600 TABLET, EXTENDED RELEASE ORAL at 15:49

## 2017-02-04 RX ADMIN — INSULIN ASPART SCH UNITS: 100 INJECTION, SOLUTION INTRAVENOUS; SUBCUTANEOUS at 12:50

## 2017-02-04 RX ADMIN — ROSUVASTATIN CALCIUM SCH MG: 20 TABLET, FILM COATED ORAL at 20:53

## 2017-02-04 RX ADMIN — CIPROFLOXACIN SCH MG: 500 TABLET, FILM COATED ORAL at 20:53

## 2017-02-04 RX ADMIN — STANDARDIZED SENNA CONCENTRATE AND DOCUSATE SODIUM SCH TAB: 8.6; 5 TABLET ORAL at 20:53

## 2017-02-04 RX ADMIN — METOPROLOL TARTRATE SCH MG: 50 TABLET, FILM COATED ORAL at 20:53

## 2017-02-04 RX ADMIN — PANTOPRAZOLE SCH MG: 40 TABLET, DELAYED RELEASE ORAL at 08:40

## 2017-02-04 RX ADMIN — GABAPENTIN SCH MG: 100 CAPSULE ORAL at 08:40

## 2017-02-04 RX ADMIN — INSULIN ASPART SCH UNITS: 100 INJECTION, SOLUTION INTRAVENOUS; SUBCUTANEOUS at 20:59

## 2017-02-04 RX ADMIN — Medication SCH MG: at 08:40

## 2017-02-04 RX ADMIN — INSULIN ASPART SCH UNITS: 100 INJECTION, SOLUTION INTRAVENOUS; SUBCUTANEOUS at 08:43

## 2017-02-04 RX ADMIN — VANCOMYCIN HYDROCHLORIDE SCH MLS/HR: 1 INJECTION, POWDER, LYOPHILIZED, FOR SOLUTION INTRAVENOUS at 04:09

## 2017-02-04 RX ADMIN — INSULIN ASPART SCH UNITS: 100 INJECTION, SOLUTION INTRAVENOUS; SUBCUTANEOUS at 17:35

## 2017-02-04 RX ADMIN — NYSTATIN SCH ML: 100000 SUSPENSION ORAL at 20:53

## 2017-02-04 NOTE — DISCHARGE SUMMARY
DATE OF DISCHARGE:  02/04/2017.  

 

PRINCIPAL DIAGNOSIS:  Spinal stenosis.

 

HOSPITAL COURSE AS FOLLOWS:  On 01/30/2017 patient underwent multilevel

lumbar decompression and fusion, tolerated this well and taken to the

orthopedic floor postoperatively.  Postop day #1,  she had some confusion,

but progressed nicely over the course of the week as we discontinued

medications.  She did require blood transfusion on  02/03/2017.  On 02/04/217

she is alert, oriented and ambulating well and subsequently discharged to

[facility].  Discharge orders and instructions can be found on the chart for

further review.

## 2017-02-04 NOTE — PROGRESS NOTE
Subjective


Date of Service:


Feb 4, 2017.


Subjective


Pt evaluation today including:  conversation w/ patient, conversation w/ family 

( at bedside), physical exam, chart review, lab review, conversation w/ 

consultant (orthopedics), review of inpatient medication list


Pain:  denies abd pain


PO Intake:  tolerating clears w/o nausea/emesis


Voiding:  no voiding problems


feels better today


more energy


more flatus (still no BM, however)


no nausea or emesis 





walking better per staff





has cough with mucous with slight blood tinge at times





mouth is sore as well





Review of Systems


Constitutional:  No chills, No fever


Respiratory:  + cough, + sputum, No dyspnea at rest


Cardiac:  No chest pain


Abdomen:  No pain


Female :  No dysuria





Objective


Vital Signs











  Date Time  Temp Pulse Resp B/P Pulse Ox O2 Delivery O2 Flow Rate FiO2


 


2/4/17 16:00      Nasal Cannula 2.0 





      Humidified Air  


 


2/4/17 15:24 36.8 63 18 159/71 95 Nasal Cannula 2.0 


 


2/4/17 12:15      Room Air  


 


2/4/17 10:04 36.5 59 18  96 Room Air  


 


2/4/17 07:10      Nasal Cannula 2.0 


 


2/3/17 23:58      Nasal Cannula 2.0 


 


2/3/17 23:11 36.5 59 18 157/75 96 Nasal Cannula 2.0 











Physical Exam


General Appearance:  no apparent distress, + pertinent finding (overall looks 

better today)


ENT:  + pertinent finding (thrush on tongue)


Neck:  no JVD


Respiratory/Chest:  no respiratory distress, no accessory muscle use, + rales (

bases), + wheezing (scant end-exp)


Cardiovascular:  regular rate, rhythm, no gallop, no murmur


Abdomen:  normal bowel sounds, non tender, soft (slightly distended but much 

better today), no organomegaly


Extremities:  no pedal edema


Neurologic/Psychiatric:  no motor/sensory deficits, alert, oriented x 3





Laboratory Results





Last 24 Hours








Test


  2/3/17


20:44 2/4/17


05:40 2/4/17


08:02 2/4/17


11:04


 


Bedside Glucose 116 mg/dl   103 mg/dl  142 mg/dl 


 


White Blood Count  7.98 K/uL   


 


Red Blood Count  3.34 M/uL   


 


Hemoglobin  10.3 g/dL   


 


Hematocrit  32.4 %   


 


Mean Corpuscular Volume  97.0 fL   


 


Mean Corpuscular Hemoglobin  30.8 pg   


 


Mean Corpuscular Hemoglobin


Concent 


  31.8 g/dl 


  


  


 


 


Platelet Count  174 K/uL   


 


Mean Platelet Volume  9.8 fL   


 


Neutrophils (%) (Auto)  81.8 %   


 


Lymphocytes (%) (Auto)  5.8 %   


 


Monocytes (%) (Auto)  10.9 %   


 


Eosinophils (%) (Auto)  0.9 %   


 


Basophils (%) (Auto)  0.1 %   


 


Neutrophils # (Auto)  6.53 K/uL   


 


Lymphocytes # (Auto)  0.46 K/uL   


 


Monocytes # (Auto)  0.87 K/uL   


 


Eosinophils # (Auto)  0.07 K/uL   


 


Basophils # (Auto)  0.01 K/uL   


 


RDW Standard Deviation  62.0 fL   


 


RDW Coefficient of Variation  17.3 %   


 


Immature Granulocyte % (Auto)  0.5 %   


 


Immature Granulocyte # (Auto)  0.04 K/uL   


 


Sodium Level  140 mmol/L   


 


Potassium Level  3.9 mmol/L   


 


Chloride Level  103 mmol/L   


 


Carbon Dioxide Level  30 mmol/L   


 


Anion Gap  7.0 mmol/L   


 


Blood Urea Nitrogen  18 mg/dl   


 


Creatinine  1.10 mg/dl   


 


Est Creatinine Clear Calc


Drug Dose 


  43.8 ml/min 


  


  


 


 


Estimated GFR (


American) 


  54.5 


  


  


 


 


Estimated GFR (Non-


American 


  47.0 


  


  


 


 


BUN/Creatinine Ratio  16.3   


 


Random Glucose  101 mg/dl   


 


Calcium Level  7.7 mg/dl   


 


Phosphorus Level  2.9 mg/dl   


 


Magnesium Level  2.2 mg/dl   














Test


  2/4/17


17:06 


  


  


 


 


Bedside Glucose 159 mg/dl    











Assessment and Plan


80yo female with:





1.  ileus - clinically improved


Advance diet to full liquids


Ambulate. 





Rx the UTI.


Avoid narcotics if possible.  





2.  encephalopathy - toxic and metabolic - resolved.  





3.  enterococcal UTI - stop vanco, change to po cipro; day #3/7 of abx today.  

Improved. 





4.  HTN - stable control. 





5.  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 

25mg BID IV.


Change to po prednisone today, and then wean.





6.  acute blood loss anemia - s/p 2 units PRBCs with improved H/H. 


Was likely multifactorial - blood loss from surgery, post-op blood loss from 

drain, dilutional, blood draws, etc.


H/H better today.


Repeat Hb in am. 


Ferrous sulfate at d/c. 





7.  suspected acute diastolic CHF 2nd to fluids, HTN, PRBCs - stop all IVF.


Lasix 20mg IV x 1.  





8.  thrush - nystatin QID.





9.  slightly bloody sputum - I saw the sputum and it was pink-tinged; this is 

likely pulmonary edema.


However, if it persists, will need pulmonary consultation in future. 





Messaged Dr. Blunt and recommended 1 additional hospital day due to above 

issues.  


He agrees.


Hopefully d/c in AM. 





 updated.


Continued [facility] stay due to:  ambulation difficulties, multiple IV medications 

needed


Discharge planning:  rehab hospital

## 2017-02-05 VITALS — SYSTOLIC BLOOD PRESSURE: 179 MMHG | DIASTOLIC BLOOD PRESSURE: 71 MMHG | HEART RATE: 71 BPM

## 2017-02-05 VITALS
HEART RATE: 70 BPM | SYSTOLIC BLOOD PRESSURE: 149 MMHG | DIASTOLIC BLOOD PRESSURE: 60 MMHG | OXYGEN SATURATION: 91 % | TEMPERATURE: 98.42 F

## 2017-02-05 VITALS — SYSTOLIC BLOOD PRESSURE: 158 MMHG | DIASTOLIC BLOOD PRESSURE: 66 MMHG | HEART RATE: 68 BPM | OXYGEN SATURATION: 88 %

## 2017-02-05 VITALS
HEART RATE: 66 BPM | OXYGEN SATURATION: 95 % | DIASTOLIC BLOOD PRESSURE: 67 MMHG | TEMPERATURE: 97.52 F | SYSTOLIC BLOOD PRESSURE: 149 MMHG

## 2017-02-05 VITALS — OXYGEN SATURATION: 94 %

## 2017-02-05 LAB
ANION GAP SERPL CALC-SCNC: 7 MMOL/L (ref 3–11)
BUN SERPL-MCNC: 18 MG/DL (ref 7–18)
BUN/CREAT SERPL: 18.1 (ref 10–20)
CALCIUM SERPL-MCNC: 7.9 MG/DL (ref 8.5–10.1)
CHLORIDE SERPL-SCNC: 101 MMOL/L (ref 98–107)
CO2 SERPL-SCNC: 31 MMOL/L (ref 21–32)
CREAT CL PREDICTED SERPL C-G-VRATE: 49.2 ML/MIN
CREAT SERPL-MCNC: 0.98 MG/DL (ref 0.6–1.2)
GLUCOSE SERPL-MCNC: 110 MG/DL (ref 70–99)
MAGNESIUM SERPL-MCNC: 2.3 MG/DL (ref 1.8–2.4)
POTASSIUM SERPL-SCNC: 3.9 MMOL/L (ref 3.5–5.1)
SODIUM SERPL-SCNC: 139 MMOL/L (ref 136–145)

## 2017-02-05 RX ADMIN — NYSTATIN SCH ML: 100000 SUSPENSION ORAL at 08:35

## 2017-02-05 RX ADMIN — INSULIN ASPART SCH UNITS: 100 INJECTION, SOLUTION INTRAVENOUS; SUBCUTANEOUS at 18:01

## 2017-02-05 RX ADMIN — GUAIFENESIN SCH MG: 600 TABLET, EXTENDED RELEASE ORAL at 08:34

## 2017-02-05 RX ADMIN — INSULIN ASPART SCH UNITS: 100 INJECTION, SOLUTION INTRAVENOUS; SUBCUTANEOUS at 21:48

## 2017-02-05 RX ADMIN — GUAIFENESIN SCH MG: 600 TABLET, EXTENDED RELEASE ORAL at 20:18

## 2017-02-05 RX ADMIN — NYSTATIN SCH ML: 100000 SUSPENSION ORAL at 17:00

## 2017-02-05 RX ADMIN — HYDROCODONE BITARTRATE AND ACETAMINOPHEN PRN TAB: 5; 325 TABLET ORAL at 06:28

## 2017-02-05 RX ADMIN — STANDARDIZED SENNA CONCENTRATE AND DOCUSATE SODIUM SCH TAB: 8.6; 5 TABLET ORAL at 20:17

## 2017-02-05 RX ADMIN — ROSUVASTATIN CALCIUM SCH MG: 20 TABLET, FILM COATED ORAL at 20:17

## 2017-02-05 RX ADMIN — NYSTATIN SCH ML: 100000 SUSPENSION ORAL at 20:18

## 2017-02-05 RX ADMIN — PANTOPRAZOLE SCH MG: 40 TABLET, DELAYED RELEASE ORAL at 08:34

## 2017-02-05 RX ADMIN — HYDROCODONE BITARTRATE AND ACETAMINOPHEN PRN TAB: 5; 325 TABLET ORAL at 20:17

## 2017-02-05 RX ADMIN — INSULIN ASPART SCH UNITS: 100 INJECTION, SOLUTION INTRAVENOUS; SUBCUTANEOUS at 12:54

## 2017-02-05 RX ADMIN — METOPROLOL TARTRATE SCH MG: 50 TABLET, FILM COATED ORAL at 08:35

## 2017-02-05 RX ADMIN — GABAPENTIN SCH MG: 100 CAPSULE ORAL at 20:18

## 2017-02-05 RX ADMIN — TRAMADOL HYDROCHLORIDE PRN MG: 50 TABLET, COATED ORAL at 23:28

## 2017-02-05 RX ADMIN — AMLODIPINE BESYLATE SCH MG: 5 TABLET ORAL at 08:34

## 2017-02-05 RX ADMIN — GABAPENTIN SCH MG: 100 CAPSULE ORAL at 08:34

## 2017-02-05 RX ADMIN — CIPROFLOXACIN SCH MG: 500 TABLET, FILM COATED ORAL at 20:18

## 2017-02-05 RX ADMIN — CIPROFLOXACIN SCH MG: 500 TABLET, FILM COATED ORAL at 08:35

## 2017-02-05 RX ADMIN — NYSTATIN SCH ML: 100000 SUSPENSION ORAL at 12:54

## 2017-02-05 RX ADMIN — METOPROLOL TARTRATE SCH MG: 50 TABLET, FILM COATED ORAL at 20:22

## 2017-02-05 RX ADMIN — PANTOPRAZOLE SCH MG: 40 TABLET, DELAYED RELEASE ORAL at 20:19

## 2017-02-05 RX ADMIN — INSULIN ASPART SCH UNITS: 100 INJECTION, SOLUTION INTRAVENOUS; SUBCUTANEOUS at 08:40

## 2017-02-05 RX ADMIN — Medication SCH MG: at 08:34

## 2017-02-05 NOTE — PROGRESS NOTE
Subjective


Date of Service:


Feb 5, 2017.


Subjective


Pt evaluation today including:  conversation w/ patient, physical exam, chart 

review, lab review, conversation w/ consultant (orthopedics - Dr. Blunt), 

review of inpatient medication list


Pain:  back only; scant abdominal discomfort


PO Intake:  tolerating fulls w/o nausea/emesis


Voiding:  no voiding problems


feeling very good


denies orthopnea, sob at rest or mullen


had very good diuresis yesterday


nurses report o2 sats of 87-88% with walking in room air 





passing flatus but still no BM





Review of Systems


Constitutional:  No chills, No fever


Respiratory:  + cough, + sputum (no further pink sputum ), No dyspnea on 

exertion


Cardiac:  No PND, No chest pain, No edema, No orthopnea


Abdomen:  No nausea, No pain, No vomiting





Objective


Vital Signs











  Date Time  Temp Pulse Resp B/P Pulse Ox O2 Delivery O2 Flow Rate FiO2


 


2/5/17 14:28      Nasal Cannula 0.5 


 


2/5/17 07:45      Room Air  


 


2/5/17 06:43     94 Nasal Cannula 2.0 


 


2/5/17 06:42  68 20 158/66 88 Room Air  


 


2/5/17 00:30      Nasal Cannula 2.0 


 


2/4/17 23:09 36.8 62 18 161/71 95 Nasal Cannula 1.5 


 


2/4/17 22:33    152/71    


 


2/4/17 22:15    178/72    


 


2/4/17 20:45  72  173/68    


 


2/4/17 16:00      Nasal Cannula 2.0 





      Humidified Air  


 


2/4/17 15:24 36.8 63 18 159/71 95 Nasal Cannula 2.0 











Physical Exam


General Appearance:  no apparent distress


ENT:  + pertinent finding (thrush improved on tongue)


Neck:  + JVD (very mild)


Respiratory/Chest:  no respiratory distress, no accessory muscle use, + 

decreased breath sounds (bases), + pertinent finding (no discrete rales or 

wheeze)


Cardiovascular:  regular rate, rhythm, no gallop, no murmur


Abdomen:  normal bowel sounds, non tender, soft, no organomegaly, + distended (

minimal today; much improved from yesterday)


Extremities:  no pedal edema


Neurologic/Psychiatric:  alert, oriented x 3


Skin:  + pertinent finding (pallor resolved)





Laboratory Results





Last 24 Hours








Test


  2/4/17


17:06 2/4/17


20:49 2/5/17


05:05 2/5/17


08:11


 


Bedside Glucose 159 mg/dl  197 mg/dl   134 mg/dl 


 


Hemoglobin   10.3 g/dL  


 


Sodium Level   139 mmol/L  


 


Potassium Level   3.9 mmol/L  


 


Chloride Level   101 mmol/L  


 


Carbon Dioxide Level   31 mmol/L  


 


Anion Gap   7.0 mmol/L  


 


Blood Urea Nitrogen   18 mg/dl  


 


Creatinine   0.98 mg/dl  


 


Est Creatinine Clear Calc


Drug Dose 


  


  49.2 ml/min 


  


 


 


Estimated GFR (


American) 


  


  62.7 


  


 


 


Estimated GFR (Non-


American 


  


  54.1 


  


 


 


BUN/Creatinine Ratio   18.1  


 


Random Glucose   110 mg/dl  


 


Calcium Level   7.9 mg/dl  


 


Magnesium Level   2.3 mg/dl  














Test


  2/5/17


11:59 


  


  


 


 


Bedside Glucose 110 mg/dl    











Assessment and Plan


82yo female with:





1.  ileus - clinically improved/resolved.  


Advance diet to diabetic/low fiber.


Dulcolax x 1 again today.  


Ambulate. 





Rx the UTI.


Avoid narcotics if possible.  





2.  encephalopathy - toxic and metabolic - resolved.  





3.  enterococcal UTI - day #4/7 of abx today.  Cont cipro.  





4.  HTN - stable control. 





5.  chronic steroid dependency due to psoriatic arthritis - on prednisone 

taper.  


20mg today, 10mg tomorrow, then 5mg daily thereafter. 





6.  acute blood loss anemia - s/p 2 units PRBCs with improved H/H. 


Was likely multifactorial - blood loss from surgery, post-op blood loss from 

drain, dilutional, blood draws, etc.


H/H stable.  


Ferrous sulfate at d/c. 





7.  probable acute diastolic CHF 2nd to fluids, HTN, PRBCs - good diuresis 

overnight.


Will give lasix 20mg IV x 1 this am, then repeat again later today at 1600.  


K supplementation.


Follow output and AM bmp.   





8.  thrush - nystatin QID.





9.  slightly bloody sputum - I saw the sputum and it was pink-tinged; this is 

likely pulmonary edema.  


IMPROVED today. 


However, if it persists, will need pulmonary consultation in future. 





spoke with Dr. Blunt - plan is for diuresis today with hopes of [facility] 

tomorrow


Continued [facility] stay due to:  ambulation difficulties, multiple IV medications 

needed


Discharge planning:  rehab hospital

## 2017-02-05 NOTE — ORTHOPEDIC PROGRESS NOTE
Orthopedic Progress Note


Date of Service


Feb 5, 2017.





Subjective


Additional Notes:


Doing well overall, questionable pulmonary edema by medicine.  Stable from 

spine standpoint.





Objective


calves soft nontender, N/V intact, dressing C/D/I, A&O x3, toes mobile, hemovac 

drainage











  Date Time  Temp Pulse Resp B/P Pulse Ox O2 Delivery O2 Flow Rate FiO2


 


2/5/17 06:43     94 Nasal Cannula 2.0 


 


2/5/17 06:42  68 20 158/66 88 Room Air  


 


2/5/17 00:30      Nasal Cannula 2.0 


 


2/4/17 23:09 36.8 62 18 161/71 95 Nasal Cannula 1.5 


 


2/4/17 22:33    152/71    


 


2/4/17 22:15    178/72    


 


2/4/17 20:45  72  173/68    


 


2/4/17 16:00      Nasal Cannula 2.0 





      Humidified Air  


 


2/4/17 15:24 36.8 63 18 159/71 95 Nasal Cannula 2.0 


 


2/4/17 12:15      Room Air  


 


2/4/17 10:04 36.5 59 18  96 Room Air  








Laboratory Results 24 Hours:











Test


  2/5/17


05:05


 


Hemoglobin 10.3 g/dL 











Assessment & Plan


Assessment:


s/p lumbar fusion


Plan:


Transfer when medically stable.

## 2017-02-06 VITALS
HEART RATE: 58 BPM | SYSTOLIC BLOOD PRESSURE: 166 MMHG | DIASTOLIC BLOOD PRESSURE: 68 MMHG | OXYGEN SATURATION: 92 % | TEMPERATURE: 98.06 F

## 2017-02-06 VITALS — HEART RATE: 78 BPM

## 2017-02-06 VITALS — OXYGEN SATURATION: 92 %

## 2017-02-06 LAB
ANION GAP SERPL CALC-SCNC: 6 MMOL/L (ref 3–11)
BUN SERPL-MCNC: 24 MG/DL (ref 7–18)
BUN/CREAT SERPL: 19.6 (ref 10–20)
CALCIUM SERPL-MCNC: 8 MG/DL (ref 8.5–10.1)
CHLORIDE SERPL-SCNC: 99 MMOL/L (ref 98–107)
CO2 SERPL-SCNC: 33 MMOL/L (ref 21–32)
CREAT CL PREDICTED SERPL C-G-VRATE: 41.3 ML/MIN
CREAT SERPL-MCNC: 1.2 MG/DL (ref 0.6–1.2)
GLUCOSE SERPL-MCNC: 117 MG/DL (ref 70–99)
POTASSIUM SERPL-SCNC: 3.8 MMOL/L (ref 3.5–5.1)
SODIUM SERPL-SCNC: 138 MMOL/L (ref 136–145)

## 2017-02-06 RX ADMIN — AMLODIPINE BESYLATE SCH MG: 5 TABLET ORAL at 08:36

## 2017-02-06 RX ADMIN — NYSTATIN SCH ML: 100000 SUSPENSION ORAL at 08:34

## 2017-02-06 RX ADMIN — HYDROCODONE BITARTRATE AND ACETAMINOPHEN PRN TAB: 5; 325 TABLET ORAL at 08:43

## 2017-02-06 RX ADMIN — INSULIN ASPART SCH UNITS: 100 INJECTION, SOLUTION INTRAVENOUS; SUBCUTANEOUS at 11:47

## 2017-02-06 RX ADMIN — Medication SCH MG: at 08:36

## 2017-02-06 RX ADMIN — GABAPENTIN SCH MG: 100 CAPSULE ORAL at 08:36

## 2017-02-06 RX ADMIN — PANTOPRAZOLE SCH MG: 40 TABLET, DELAYED RELEASE ORAL at 08:36

## 2017-02-06 RX ADMIN — GUAIFENESIN SCH MG: 600 TABLET, EXTENDED RELEASE ORAL at 08:36

## 2017-02-06 RX ADMIN — CIPROFLOXACIN SCH MG: 500 TABLET, FILM COATED ORAL at 08:36

## 2017-02-06 RX ADMIN — TRAMADOL HYDROCHLORIDE PRN MG: 50 TABLET, COATED ORAL at 11:50

## 2017-02-06 RX ADMIN — METOPROLOL TARTRATE SCH MG: 50 TABLET, FILM COATED ORAL at 08:35

## 2017-02-06 RX ADMIN — INSULIN ASPART SCH UNITS: 100 INJECTION, SOLUTION INTRAVENOUS; SUBCUTANEOUS at 08:39

## 2017-02-06 NOTE — HOSPITALIST PROGRESS NOTE
Hospitalist Progress Note


Date of Service


Feb 6, 2017.


 (Jacquelin Ballard ., PA-C)





Subjective


Pt evaluation today including:  conversation w/ patient, physical exam, chart 

review, lab review, review of inpatient medication list


PO Intake:  Tolerating low fiber PO diet


Voiding:  no voiding problems


The patient complains of some intermittent abdominal cramping that started 

yesterday and has become somewhat more severe today.  She states that today it 

occurred while she was eating her breakfast and again later while she was up 

walking.  The pain is more so on her right side but it is transient and 

resolves after a few minutes on its own.  The patient has been tolerating a 

solid low fiber diet without difficulty.  She admits to passing little flatus 

this morning, but she did pass more gas last night and did have a bowel 

movement yesterday as well.  The patient also complains of a decrease in 

appetite.  The patient denies fevers, chills, sweats, chest pain, palpitations, 

claudication, cough, wheezing, shortness of breath, nausea, vomiting, dysuria, 

hematuria, urinary retention, paralysis, weakness, numbness and tingling.





   Additional Comments:


See HPI for pertinent positives and negatives.  All other systems reviewed and 

negative.


 (Jacquelin Ballard ., PA-C)





Objective


Vital Signs











  Date Time  Temp Pulse Resp B/P Pulse Ox O2 Delivery O2 Flow Rate FiO2


 


2/6/17 08:39  78      


 


2/6/17 07:50     92 Room Air  


 


2/6/17 07:42 36.7 58 19 166/68 92 Room Air  


 


2/6/17 07:14      Room Air  


 


2/5/17 23:05 36.9 70 18 149/60 91 Room Air  


 


2/5/17 20:20  71  179/71    


 


2/5/17 19:20      Room Air  


 


2/5/17 15:13 36.4 66 18 149/67 95 Nasal Cannula 1.0 


 


2/5/17 14:28      Nasal Cannula 0.5 








 (Jacquelin Ballard ., PA-C)





Physical Exam


General Appearance:  WD/WN, no apparent distress, + obese


Eyes:  normal inspection, PERRL, EOMI


ENT:  normal ENT inspection, hearing grossly normal, + pertinent finding (thrush

)


Neck:  supple, no JVD, trachea midline


Respiratory/Chest:  lungs clear, normal breath sounds, no respiratory distress


Cardiovascular:  regular rate, rhythm, no gallop, + systolic murmur


Abdomen:  normal bowel sounds, non tender, soft


Extremities:  non-tender, normal inspection, no pedal edema


Neurologic/Psychiatric:  alert, normal mood/affect, oriented x 3


Skin:  normal color, warm/dry, no rash


 (Jacquelin Ballard ., PA-C)





Laboratory Results





Last 24 Hours








Test


  2/5/17


11:59 2/5/17


16:53 2/5/17


19:55 2/6/17


04:30


 


Bedside Glucose 110 mg/dl  182 mg/dl  137 mg/dl  


 


Sodium Level    138 mmol/L 


 


Potassium Level    3.8 mmol/L 


 


Chloride Level    99 mmol/L 


 


Carbon Dioxide Level    33 mmol/L 


 


Anion Gap    6.0 mmol/L 


 


Blood Urea Nitrogen    24 mg/dl 


 


Creatinine    1.20 mg/dl 


 


Est Creatinine Clear Calc


Drug Dose 


  


  


  41.3 ml/min 


 


 


Estimated GFR (


American) 


  


  


  49.1 


 


 


Estimated GFR (Non-


American 


  


  


  42.4 


 


 


BUN/Creatinine Ratio    19.6 


 


Random Glucose    117 mg/dl 


 


Calcium Level    8.0 mg/dl 














Test


  2/6/17


08:09 


  


  


 


 


Bedside Glucose 86 mg/dl    








 (Jacquelin Ballard ., PA-C)





Assessment and Plan


80 y/o female with a history of CKD stage III, HTN, HLD, DM II, psoriatic 

arthritis, CAD and GERD who presents s/p L2-S1 decompression and fusion with 

Dr. Blunt on 1/30 for medical management. 


-Pain management, DVT prophylaxis, and PT/OT as per primary team





Mild post-op ileus


-Chest/abdomen x-ray:  non-obstructed abdominal bowel gas pattern, mild gas 

distention of small bowel and colon may represent mild post op ileus.  Non-

obstructing right renal calculus.  Stable cardiomegaly.


-Tolerating low fiber diet.  No nausea, vomiting.  1 small BM 2/5


-Dulcolax suppository x 1 on 2/2 with no change


-Dulcolax 5 mg PO x 1 on 2/3





Confusion/delirium--resolved


-Avoid medications on Beer's criteria list


-D/C Ativan, hydroxyzine, metoclopramide, promethazine per Beer's criteria





UTI--complicated, possibly Kumar associated


-Check UA and urine culture for possible underlying UTI causing AMS


-UA 2+ bacteria, trace blood and leuks


-Urine culture positive for pan-sensitive enterococcus


-Started vancomycin on 2/2 due to PCN allergy


-Vanc D/C'd 2/4, switched to PO Cipro.  Day #5 of 7, will continue at [facility]





Acute blood loss anemia in postoperative setting


-Hgb had been stable in 10s postoperatively


-Hgb on 2/2 down to 8.2


-Hgb on 2/3 7.4, transfused 2 units PRBCs.  Possible hematoma?


-Repeat Hgb has been stable in 10s after blood





Leukocytosis--resolved


-WBC increased 2/1 to 14.44 from 13.19


-WBC improved to 7.98 on 2/2.  Remains stable.


-May be secondary to steroids, infectious source





CKD stage III--stable.  Baseline creatinine 1.2


-Monitor with PRP, creatinine stable





HTN--pt has been consistently hypertensive


-Continue Lopressor 50 mg PO BID as pt has many periods of bradycardia


-Increase Norvasc to 10 mg PO qd


-Continue to cover with hydralazine 5 mg IV q6h prn SBP >175


-Will need to follow up with PCP regarding blood pressure control





HLD


-Continue rosuvastatin 20 mg PO qd





Diabetes mellitus type 2--Last HgbA1c checked 5/25/16 was 6.2.  Pt is diet 

controlled


-Insulin sliding scale


-Check BSGs q ac and qhs


-Rechecked HgbA1c on 1/30 was 6.2





Psoriatic arthritis


-Hold methotrexate, pt takes 12.5 mg PO once weekly on Sundays


-Received stress dose steroids, hydrocortisone 25 mg IV q12h x 2 days, then 

switched to Prednisone


-Prednisone tapered:  30 mg x 1 day, 20 mg x 1 day, 10 mg x 1, then back to 

home dose of 5 mg PO qd





GERD


-Continue esomeprazole 40 mg PO BID


-Continue famotidine 20 mg PO BID





Code Status


-Level I, FULL RESUSCITATION STATUS








Dispo:


-Pt. is stable from a medical standpoint, we will sign off.  Clear for 

discharge to [facility] as per primary team.


 (Jacquelin Ballard ., PA-C)





I agree with PA assessment and plan and have personally seen and examined pt


Pt ambulating tolerating PO intake


Mild abd discomfort


Abd: Soft mild tenderness to palpation


VSS


Labs reviewed


Cont antibx for UTI


Cont ambulation


Ok to discharge to  from medical team perspective


 (Karthik Romero, D.O.)

## 2017-02-06 NOTE — DISCHARGE SUMMARY
PRINCIPAL DIAGNOSIS:  Spinal stenosis.

 

HOSPITAL COURSE AS FOLLOWS:  On January 30th, the patient underwent lumbar

decompression and fusion, tolerated this well and taken to the orthopedic

floor postoperatively.  Postop day #1, she had some postoperative confusion

lasting about 24 hours, but progressed nicely thereafter, slowly improving

with physical therapy.  We did believe she struggled with a bit of CHF and

required 2 days of IV Lasix.  This provided significant diuresis and

improvement of oxygenation.  Subsequently, on 02/06/2017 she was discharged

to [facility].  Discharge orders and instructions are in the chart for

further review.

## 2017-02-22 ENCOUNTER — HOSPITAL ENCOUNTER (OUTPATIENT)
Dept: HOSPITAL 45 - C.RDSM | Age: 82
Discharge: HOME | End: 2017-02-22
Attending: ORTHOPAEDIC SURGERY
Payer: COMMERCIAL

## 2017-02-22 DIAGNOSIS — R52: Primary | ICD-10-CM

## 2017-05-04 ENCOUNTER — HOSPITAL ENCOUNTER (OUTPATIENT)
Dept: HOSPITAL 45 - C.MRIBC | Age: 82
Discharge: HOME | End: 2017-05-04
Attending: ORTHOPAEDIC SURGERY
Payer: COMMERCIAL

## 2017-05-04 DIAGNOSIS — M75.102: Primary | ICD-10-CM

## 2017-05-04 NOTE — DIAGNOSTIC IMAGING REPORT
MRI LEFT SHOULDER NO CONTRAST



CLINICAL HISTORY: L SHOULDER PAIN Limited range of motion



COMPARISON STUDY:  No previous studies for comparison.



FINDINGS: Imaging was performed in the paracoronal, sagittal, and axial planes.



There are no areas of marrow edema to indicate occult fracture.



There is a full-thickness supraspinatus tear. There is tendinous retraction of a

portion of the fibers.



Degenerative changes are present within the AC joint.



There is a small joint effusion.



There is irregularity of the anterior glenoid labrum



There is increased signal within the bicipital tendon suggestive of

tendinopathy/partial tear.



IMPRESSION:  Full-thickness tear of the supraspinatus tendon. 









Electronically signed by:  Avi Cruz M.D.

5/4/2017 2:40 PM



Dictated Date/Time:  5/4/2017 2:36 PM

## 2017-05-17 ENCOUNTER — HOSPITAL ENCOUNTER (OUTPATIENT)
Dept: HOSPITAL 45 - C.LAB1850 | Age: 82
Discharge: HOME | End: 2017-05-17
Attending: INTERNAL MEDICINE
Payer: COMMERCIAL

## 2017-05-17 DIAGNOSIS — I25.10: ICD-10-CM

## 2017-05-17 DIAGNOSIS — E11.29: ICD-10-CM

## 2017-05-17 DIAGNOSIS — E11.9: ICD-10-CM

## 2017-05-17 DIAGNOSIS — L40.50: Primary | ICD-10-CM

## 2017-05-17 DIAGNOSIS — D64.9: ICD-10-CM

## 2017-05-17 DIAGNOSIS — M48.00: ICD-10-CM

## 2017-05-17 DIAGNOSIS — N18.3: ICD-10-CM

## 2017-05-17 DIAGNOSIS — D47.2: ICD-10-CM

## 2017-05-17 DIAGNOSIS — I10: ICD-10-CM

## 2017-05-17 LAB
ALBUMIN/GLOB SERPL: 0.9 {RATIO} (ref 0.9–2)
ALP SERPL-CCNC: 80 U/L (ref 45–117)
ALT SERPL-CCNC: 17 U/L (ref 12–78)
ANION GAP SERPL CALC-SCNC: 2 MMOL/L (ref 3–11)
APPEARANCE UR: CLEAR
AST SERPL-CCNC: 16 U/L (ref 15–37)
BILIRUB UR-MCNC: (no result) MG/DL
BUN SERPL-MCNC: 25 MG/DL (ref 7–18)
BUN/CREAT SERPL: 20.4 (ref 10–20)
CALCIUM SERPL-MCNC: 8.5 MG/DL (ref 8.5–10.1)
CHLORIDE SERPL-SCNC: 105 MMOL/L (ref 98–107)
CO2 SERPL-SCNC: 33 MMOL/L (ref 21–32)
COLOR UR: (no result)
CREAT SERPL-MCNC: 1.2 MG/DL (ref 0.6–1.2)
CREAT UR-MCNC: 300 MG/DL
EOSINOPHIL NFR BLD AUTO: 257 K/UL (ref 130–400)
EST. AVERAGE GLUCOSE BLD GHB EST-MCNC: 137 MG/DL
GLOBULIN SER-MCNC: 3.9 GM/DL (ref 2.5–4)
GLUCOSE SERPL-MCNC: 98 MG/DL (ref 70–99)
HCT VFR BLD CALC: 38.2 % (ref 37–47)
MANUAL MICROSCOPIC REQUIRED?: NO
MCH RBC QN AUTO: 32.3 PG (ref 25–34)
MCHC RBC AUTO-ENTMCNC: 31.4 G/DL (ref 32–36)
MCV RBC AUTO: 103 FL (ref 80–100)
NITRITE UR QL STRIP: (no result)
PH UR STRIP: 5 [PH] (ref 4.5–7.5)
PMV BLD AUTO: 10.1 FL (ref 7.4–10.4)
POTASSIUM SERPL-SCNC: 4.1 MMOL/L (ref 3.5–5.1)
PROT UR STRIP-MCNC: 65.5 MG/DL (ref 0–11.9)
RBC # BLD AUTO: 3.71 M/UL (ref 4.2–5.4)
REVIEW REQ?: NO
SODIUM SERPL-SCNC: 140 MMOL/L (ref 136–145)
SP GR UR STRIP: 1.03 (ref 1–1.03)
TSH SERPL-ACNC: 2.96 UIU/ML (ref 0.3–4.5)
URINE BILL WITH OR WITHOUT MIC: (no result)
URINE EPITHELIAL CELL AUTO: (no result) /LPF (ref 0–5)
URINE PROTIEN/CREAT RATIO: 0.2 (ref 0–0.2)
UROBILINOGEN UR-MCNC: (no result) MG/DL
WBC # BLD AUTO: 7.76 K/UL (ref 4.8–10.8)
ZZUR CULT IF INDIC CLEAN CATCH: NO

## 2017-06-21 ENCOUNTER — HOSPITAL ENCOUNTER (EMERGENCY)
Dept: HOSPITAL 45 - C.EDB | Age: 82
Discharge: HOME | End: 2017-06-21
Payer: COMMERCIAL

## 2017-06-21 VITALS — OXYGEN SATURATION: 94 % | HEART RATE: 57 BPM | DIASTOLIC BLOOD PRESSURE: 70 MMHG | SYSTOLIC BLOOD PRESSURE: 140 MMHG

## 2017-06-21 VITALS
HEIGHT: 67.01 IN | BODY MASS INDEX: 30.73 KG/M2 | WEIGHT: 195.77 LBS | BODY MASS INDEX: 30.73 KG/M2 | HEIGHT: 67.01 IN | WEIGHT: 195.77 LBS

## 2017-06-21 VITALS — TEMPERATURE: 98.42 F
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
## 2017-06-21 DIAGNOSIS — Z79.899: ICD-10-CM

## 2017-06-21 DIAGNOSIS — S09.90XA: Primary | ICD-10-CM

## 2017-06-21 DIAGNOSIS — S00.03XA: ICD-10-CM
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
## 2017-06-21 NOTE — DIAGNOSTIC IMAGING REPORT
LEFT HUMERUS MIN 2 VIEWS ROUTINE



CLINICAL HISTORY: Left humeral pain status post trauma     



COMPARISON: None.



DISCUSSION: No acute fractures or dislocations are visualized.    



IMPRESSION: No fractures identified.







Electronically signed by:  Avi Cruz M.D.

6/21/2017 10:50 PM



Dictated Date/Time:  6/21/2017 10:50 PM

## 2017-06-21 NOTE — DIAGNOSTIC IMAGING REPORT
THORACIC SPINE 3 VIEWS ROUTINE



CLINICAL HISTORY: Back pain status post trauma    



COMPARISON STUDY:  No previous studies for comparison.



FINDINGS: The bones are mildly osteopenic. The paraspinal line is not

significantly displaced. There are mild multilevel degenerative changes. No

acute fractures or subluxations are visualized.



IMPRESSION:  No acute fractures identified. 









Electronically signed by:  Avi Cruz M.D.

6/21/2017 10:52 PM



Dictated Date/Time:  6/21/2017 10:51 PM

## 2017-06-21 NOTE — EMERGENCY ROOM VISIT NOTE
ED Visit Note


First contact with patient:  21:35


This Patient was discussed with the physician Assistant, Jay Dillard PA-C.  

The pertinent historical and physical exam findings were confirmed.  I agree 

with the studies ordered and with the interpretations of these studies.  I 

agree with the disposition and care plan.

## 2017-06-21 NOTE — DIAGNOSTIC IMAGING REPORT
CT HEAD WITHOUT CONTRAST (CT)



CLINICAL HISTORY: Head and neck pain status post trauma    



COMPARISON STUDY:  No previous studies for comparison.



TECHNIQUE:  Axial CT of the brain is performed from the vertex to the skull

base. IV contrast was not administered for this examination.



CT DOSE:    



FINDINGS:



No intra or extra-axial mass lesions are visualized. There is no CT evidence of

acute cortical infarction. There is no evidence of midline shift. There is no

acute  hemorrhage. No calvarial fractures are visualized. 

There are patchy white matter hypodensities likely on a small vessel basis.

There is no evidence of pathologic ventricular dilatation.

There is no evidence of acute sinusitis. There is a scalp hematoma at the

anterior vertex.



IMPRESSION: Scalp hematoma. No acute intracranial findings.







Electronically signed by:  Avi Cruz M.D.

6/21/2017 10:38 PM



Dictated Date/Time:  6/21/2017 10:37 PM

## 2017-06-21 NOTE — DIAGNOSTIC IMAGING REPORT
CT OF THE CERVICAL SPINE



CLINICAL HISTORY: Neck pain status post trauma    



COMPARISON STUDY:  No previous studies for comparison. 



CT DOSE: 1089.27 mGy.cm



TECHNIQUE: CT scan of the cervical spine was performed from the skull base to

the thoracic inlet. Images are reviewed in the axial, sagittal, and coronal

planes. IV contrast was not administered for this examination.



FINDINGS:



The visualized portions of the lung apices reveal no evidence of pneumothorax.



The prevertebral soft tissues are normal.  No fractures or subluxations are

visualized.



There are multilevel degenerative changes





IMPRESSION: No evidence of acute fracture or traumatic subluxation.







Electronically signed by:  Avi Cruz M.D.

6/21/2017 10:43 PM



Dictated Date/Time:  6/21/2017 10:40 PM

## 2017-06-21 NOTE — DIAGNOSTIC IMAGING REPORT
RIGHT HUMERUS MIN 2 VIEWS ROUTINE



CLINICAL HISTORY: Right humeral pain status post trauma     



COMPARISON: None.



DISCUSSION: No fractures or dislocations are visualized. Soft tissue anchors are

visualized in the humeral head.    



IMPRESSION: Postsurgical change. No acute fractures are visualized.







Electronically signed by:  Avi Cruz M.D.

6/21/2017 10:51 PM



Dictated Date/Time:  6/21/2017 10:50 PM

## 2017-09-11 ENCOUNTER — HOSPITAL ENCOUNTER (OUTPATIENT)
Dept: HOSPITAL 45 - C.LABSPEC | Age: 82
Discharge: HOME | End: 2017-09-11
Attending: PHYSICIAN ASSISTANT
Payer: COMMERCIAL

## 2017-09-11 DIAGNOSIS — R32: Primary | ICD-10-CM

## 2017-09-11 LAB
APPEARANCE UR: (no result)
BILIRUB UR-MCNC: (no result) MG/DL
COLOR UR: (no result)
MANUAL MICROSCOPIC REQUIRED?: NO
NITRITE UR QL STRIP: (no result)
PH UR STRIP: 5 [PH] (ref 4.5–7.5)
REVIEW REQ?: YES
SP GR UR STRIP: 1.02 (ref 1–1.03)
URINE BILL WITH OR WITHOUT MIC: (no result)
URINE EPITHELIAL CELL AUTO: >30 /LPF (ref 0–5)
UROBILINOGEN UR-MCNC: (no result) MG/DL

## 2017-10-05 ENCOUNTER — HOSPITAL ENCOUNTER (OUTPATIENT)
Dept: HOSPITAL 45 - C.LABSPEC | Age: 82
Discharge: HOME | End: 2017-10-05
Attending: NURSE PRACTITIONER
Payer: COMMERCIAL

## 2017-10-05 DIAGNOSIS — R35.0: Primary | ICD-10-CM

## 2017-10-05 DIAGNOSIS — R32: ICD-10-CM

## 2017-11-13 ENCOUNTER — HOSPITAL ENCOUNTER (OUTPATIENT)
Dept: HOSPITAL 45 - C.LABBC | Age: 82
Discharge: HOME | End: 2017-11-13
Attending: INTERNAL MEDICINE
Payer: COMMERCIAL

## 2017-11-13 DIAGNOSIS — E55.9: ICD-10-CM

## 2017-11-13 DIAGNOSIS — E78.5: ICD-10-CM

## 2017-11-13 DIAGNOSIS — I70.1: ICD-10-CM

## 2017-11-13 DIAGNOSIS — I10: Primary | ICD-10-CM

## 2017-11-13 DIAGNOSIS — N20.0: ICD-10-CM

## 2017-11-13 LAB
ALBUMIN/GLOB SERPL: 0.8 {RATIO} (ref 0.9–2)
ALP SERPL-CCNC: 96 U/L (ref 45–117)
ALT SERPL-CCNC: 21 U/L (ref 12–78)
ANION GAP SERPL CALC-SCNC: 7 MMOL/L (ref 3–11)
APPEARANCE UR: CLEAR
AST SERPL-CCNC: 16 U/L (ref 15–37)
BILIRUB UR-MCNC: (no result) MG/DL
BUN SERPL-MCNC: 17 MG/DL (ref 7–18)
BUN/CREAT SERPL: 15.5 (ref 10–20)
CALCIUM SERPL-MCNC: 8.9 MG/DL (ref 8.5–10.1)
CHLORIDE SERPL-SCNC: 104 MMOL/L (ref 98–107)
CHOLEST/HDLC SERPL: 2.1 {RATIO}
CO2 SERPL-SCNC: 29 MMOL/L (ref 21–32)
COLOR UR: YELLOW
CREAT SERPL-MCNC: 1.12 MG/DL (ref 0.6–1.2)
CREAT UR-MCNC: 63.3 MG/DL
EOSINOPHIL NFR BLD AUTO: 222 K/UL (ref 130–400)
GLOBULIN SER-MCNC: 4.2 GM/DL (ref 2.5–4)
GLUCOSE SERPL-MCNC: 103 MG/DL (ref 70–99)
GLUCOSE UR QL: 76 MG/DL
HCT VFR BLD CALC: 40.8 % (ref 37–47)
KETONES UR QL STRIP: 51 MG/DL
MANUAL MICROSCOPIC REQUIRED?: NO
MCH RBC QN AUTO: 32.3 PG (ref 25–34)
MCHC RBC AUTO-ENTMCNC: 31.4 G/DL (ref 32–36)
MCV RBC AUTO: 103 FL (ref 80–100)
NITRITE UR QL STRIP: (no result)
NITRITE UR QL STRIP: 162 MG/DL (ref 0–150)
PH UR STRIP: 7 [PH] (ref 4.5–7.5)
PH UR: 159 MG/DL (ref 0–200)
PMV BLD AUTO: 10.3 FL (ref 7.4–10.4)
POTASSIUM SERPL-SCNC: 3.9 MMOL/L (ref 3.5–5.1)
PROT UR STRIP-MCNC: 38.7 MG/DL (ref 0–11.9)
RBC # BLD AUTO: 3.96 M/UL (ref 4.2–5.4)
REVIEW REQ?: NO
SODIUM SERPL-SCNC: 140 MMOL/L (ref 136–145)
SP GR UR STRIP: 1.01 (ref 1–1.03)
URINE EPITHELIAL CELL AUTO: >30 /LPF (ref 0–5)
URINE PROTIEN/CREAT RATIO: 0.6 (ref 0–0.2)
UROBILINOGEN UR-MCNC: (no result) MG/DL
VERY LOW DENSITY LIPOPROT CALC: 32 MG/DL
WBC # BLD AUTO: 6.21 K/UL (ref 4.8–10.8)

## 2017-11-28 ENCOUNTER — HOSPITAL ENCOUNTER (OUTPATIENT)
Dept: HOSPITAL 45 - C.LABSPEC | Age: 82
Discharge: HOME | End: 2017-11-28
Attending: UROLOGY
Payer: COMMERCIAL

## 2017-11-28 DIAGNOSIS — N39.0: Primary | ICD-10-CM

## 2017-12-22 ENCOUNTER — HOSPITAL ENCOUNTER (OUTPATIENT)
Dept: HOSPITAL 45 - C.RAD | Age: 82
Discharge: HOME | End: 2017-12-22
Attending: NURSE PRACTITIONER
Payer: COMMERCIAL

## 2017-12-22 ENCOUNTER — HOSPITAL ENCOUNTER (OUTPATIENT)
Dept: HOSPITAL 45 - C.LABSPEC | Age: 82
Discharge: HOME | End: 2017-12-22
Attending: NURSE PRACTITIONER
Payer: COMMERCIAL

## 2017-12-22 DIAGNOSIS — N20.0: Primary | ICD-10-CM

## 2017-12-22 DIAGNOSIS — R39.0: ICD-10-CM

## 2017-12-22 DIAGNOSIS — R35.0: ICD-10-CM

## 2017-12-22 DIAGNOSIS — R32: ICD-10-CM

## 2017-12-22 NOTE — DIAGNOSTIC IMAGING REPORT
KUB



CLINICAL HISTORY: 82 years-old Female presenting with R35.0 Urinary [id number]

Urinary [id number] Nephrolith. 



TECHNIQUE: Single supine view of the abdomen was obtained.



COMPARISON: 1/31/2017.



FINDINGS:

Mild stool burden. No bowel obstruction. No gross pneumoperitoneum.



Atherosclerosis. Bilateral nephrolithiasis suggested. Multiple pelvic

phleboliths unchanged in distribution. No convincing evidence of ureteral

calcification.



Postsurgical changes of extensive posterior fusion of L2-S1 bridging the

bilateral sacroiliac joints. Laminectomy defects at L3-L5 also noted with

interbody spacer at L4-5. Degenerative changes of the spine also noted more

superiorly. Blunting of the left costophrenic angle could suggest trace left

pleural effusion. 



IMPRESSION:

1.  Bilateral nephrolithiasis. No commencing evidence of ureteral calculi.







Electronically signed by:  Sohail Mcghee M.D.

12/22/2017 1:07 PM



Dictated Date/Time:  12/22/2017 1:05 PM

## 2018-02-02 ENCOUNTER — HOSPITAL ENCOUNTER (OUTPATIENT)
Dept: HOSPITAL 45 - C.LAB | Age: 83
Discharge: HOME | End: 2018-02-02
Attending: UROLOGY
Payer: COMMERCIAL

## 2018-02-02 DIAGNOSIS — R35.0: Primary | ICD-10-CM

## 2018-02-21 ENCOUNTER — HOSPITAL ENCOUNTER (OUTPATIENT)
Dept: HOSPITAL 45 - C.LAB | Age: 83
Discharge: HOME | End: 2018-02-21
Attending: NURSE PRACTITIONER
Payer: COMMERCIAL

## 2018-02-21 DIAGNOSIS — R35.0: Primary | ICD-10-CM

## 2018-02-21 DIAGNOSIS — N39.41: ICD-10-CM

## 2018-02-21 DIAGNOSIS — R32: ICD-10-CM

## 2018-02-21 DIAGNOSIS — N39.0: ICD-10-CM

## 2018-03-21 ENCOUNTER — HOSPITAL ENCOUNTER (OUTPATIENT)
Dept: HOSPITAL 45 - C.LABSPEC | Age: 83
Discharge: HOME | End: 2018-03-21
Attending: UROLOGY
Payer: COMMERCIAL

## 2018-03-21 DIAGNOSIS — N39.0: Primary | ICD-10-CM

## 2018-03-31 ENCOUNTER — HOSPITAL ENCOUNTER (EMERGENCY)
Dept: HOSPITAL 45 - C.EDB | Age: 83
Discharge: HOME | End: 2018-03-31
Payer: COMMERCIAL

## 2018-03-31 VITALS — OXYGEN SATURATION: 92 % | DIASTOLIC BLOOD PRESSURE: 77 MMHG | HEART RATE: 72 BPM | SYSTOLIC BLOOD PRESSURE: 158 MMHG

## 2018-03-31 VITALS
HEIGHT: 65 IN | BODY MASS INDEX: 30.38 KG/M2 | WEIGHT: 182.32 LBS | BODY MASS INDEX: 30.38 KG/M2 | HEIGHT: 65 IN | WEIGHT: 182.32 LBS

## 2018-03-31 VITALS — TEMPERATURE: 98.42 F

## 2018-03-31 DIAGNOSIS — M48.062: ICD-10-CM

## 2018-03-31 DIAGNOSIS — Z88.0: ICD-10-CM

## 2018-03-31 DIAGNOSIS — Z87.440: ICD-10-CM

## 2018-03-31 DIAGNOSIS — Z88.6: ICD-10-CM

## 2018-03-31 DIAGNOSIS — Z88.1: ICD-10-CM

## 2018-03-31 DIAGNOSIS — Z79.82: ICD-10-CM

## 2018-03-31 DIAGNOSIS — N39.0: Primary | ICD-10-CM

## 2018-03-31 DIAGNOSIS — Z88.8: ICD-10-CM

## 2018-03-31 LAB
ALBUMIN SERPL-MCNC: 3.1 GM/DL (ref 3.4–5)
ALP SERPL-CCNC: 73 U/L (ref 45–117)
ALT SERPL-CCNC: 14 U/L (ref 12–78)
AST SERPL-CCNC: 15 U/L (ref 15–37)
BASOPHILS # BLD: 0.06 K/UL (ref 0–0.2)
BASOPHILS NFR BLD: 0.5 %
BUN SERPL-MCNC: 16 MG/DL (ref 7–18)
CALCIUM SERPL-MCNC: 8.4 MG/DL (ref 8.5–10.1)
CO2 SERPL-SCNC: 31 MMOL/L (ref 21–32)
CREAT SERPL-MCNC: 1.23 MG/DL (ref 0.6–1.2)
EOS ABS #: 0.23 K/UL (ref 0–0.5)
EOSINOPHIL NFR BLD AUTO: 245 K/UL (ref 130–400)
FLUAV RNA SPEC QL NAA+PROBE: (no result)
FLUBV RNA SPEC QL NAA+PROBE: (no result)
GLUCOSE SERPL-MCNC: 138 MG/DL (ref 70–99)
HCT VFR BLD CALC: 37.7 % (ref 37–47)
HGB BLD-MCNC: 12.2 G/DL (ref 12–16)
IG#: 0.03 K/UL (ref 0–0.02)
IMM GRANULOCYTES NFR BLD AUTO: 8.2 %
LIPASE: 113 U/L (ref 73–393)
LYMPHOCYTES # BLD: 0.96 K/UL (ref 1.2–3.4)
MCH RBC QN AUTO: 32.5 PG (ref 25–34)
MCHC RBC AUTO-ENTMCNC: 32.4 G/DL (ref 32–36)
MCV RBC AUTO: 100.5 FL (ref 80–100)
MONO ABS #: 0.85 K/UL (ref 0.11–0.59)
MONOCYTES NFR BLD: 7.3 %
NEUT ABS #: 9.59 K/UL (ref 1.4–6.5)
NEUTROPHILS # BLD AUTO: 2 %
NEUTROPHILS NFR BLD AUTO: 81.7 %
PHOSPHATE SERPL-MCNC: 2.9 MG/DL (ref 2.5–4.9)
PMV BLD AUTO: 9.6 FL (ref 7.4–10.4)
POTASSIUM SERPL-SCNC: 3.9 MMOL/L (ref 3.5–5.1)
PROT SERPL-MCNC: 7.5 GM/DL (ref 6.4–8.2)
RED CELL DISTRIBUTION WIDTH CV: 14.5 % (ref 11.5–14.5)
RED CELL DISTRIBUTION WIDTH SD: 53.1 FL (ref 36.4–46.3)
SODIUM SERPL-SCNC: 134 MMOL/L (ref 136–145)
WBC # BLD AUTO: 11.72 K/UL (ref 4.8–10.8)

## 2018-03-31 NOTE — DIAGNOSTIC IMAGING REPORT
SINGLE VIEW CHEST



CLINICAL HISTORY:  Generalized abdominal pain.



FINDINGS: An AP, portable, upright chest radiograph is compared to study dated

1/31/2017. Correlation is made with chest CT dated 6/23/2008. The examination is

degraded by portable technique and patient rotation.  The heart is top normal

for projection and there is atherosclerotic calcification of the thoracic aorta.

The pulmonary vasculature is noncongested. Chronic interstitial thickening is

similar to previous. There is left basilar atelectasis. No airspace

consolidation or large pleural effusion is identified. No pneumothorax is seen.

The skeletal structures are osteopenic. The bony thorax is grossly intact. A

surgical anchor is noted in the right humeral head. Arthritic change is present

in the shoulders.



IMPRESSION: No acute cardiopulmonary abnormality.







Electronically signed by:  Sha Frederick M.D.

3/31/2018 6:51 PM



Dictated Date/Time:  3/31/2018 6:49 PM

## 2018-03-31 NOTE — EMERGENCY ROOM VISIT NOTE
History


Report prepared by Mello:  Skylar Reyes


Under the Supervision of:  Dr. Mustapha Chadwick M.D.


First contact with patient:  17:07


Chief Complaint:  FEVER


Stated Complaint:  WEAK,FEVER





History of Present Illness


The patient is a 82 year old female who presents to the Emergency Room with 

complaints of constant generalized illness beginning about 10 days ago. The 

patient reports fatigue, weakness, cough, nausea, intermittent lightheadedness, 

headache, and chills.The patient is being treated for a UTI and is still on 

Macrobid and mirabegron. She has been on antibiotics for about ten days. She 

reports when she was diagnosed with the UTI she had dark and odorous urine. She 

reports her dark and odorous urine has not resolved since being on the 

antibiotics. She denies any chest pain or shortness of breath. She notes one 

episode of vomiting occurring a week ago. The patient talked to her PCP about 

her symptoms who told her they probably were not related to the UTI.





   Source of History:  patient


   Onset:  10 days ago


   Position:  other (generalized)


   Quality:  other (illness)


   Timing:  constant


   Associated Symptoms:  + chills, + headache, + nausea, + fatigue, + weakness, 

No chest pain, No SOB





Review of Systems


See HPI for pertinent positives and negatives.  A total of ten systems were 

reviewed and were otherwise negative.





Past Medical & Surgical


Medical Problems:


(1) Complicated urinary tract infection


(2) Lumbar stenosis with neurogenic claudication


(3) Oral candidiasis








Family History





Coronary artery disease


Diabetes mellitus


Hypertension


Stroke





Social History


Smoking Status:  Never Smoker


Drug Use:  none


Marital Status:  


Housing Status:  lives with significant other


Occupation Status:  retired





Current/Historical Medications


Scheduled


Amlodipine (Norvasc), 5 MG PO QAM


Aspirin (Aspirin Ec), 81 MG PO QAM


Calcium Carbonate-Cholecalcife (Calcium 1000 + D), 1 TAB PO QAM


Cephalexin Monohydrate (Keflex), 500 MG PO BID


Cholecalciferol (Vitamin D3), 1 CAP PO QAM


Cyanocobalamin (Vitamin B12), 1 CAP PO DAILY


Esomeprazole Magnesium (Nexium), 40 MG PO BID


Folic Acid (Folic Acid), 1 TAB PO 6XWEEK


Gabapentin (Neurontin), 100 MG PO BID


Methotrexate (Methotrexate), 5 TABS PO WK


Metoprolol Tartrate (Lopressor) (Lopressor), 50 MG PO BID


Mirabegron (Myrbetriq Er), 50 MG PO DAILY


Nitrofurantoin Monohyd Macro (Nitrofurantoin Monohydrat), 1 CAP PO BID


Nitroglycerin (Nitrostat), 0.4 MG UT PRN


Ondasetron Odt (Zofran Odt), 4 MG SL Q6H


Prednisone (Prednisone), 5 MG PO QAM


Rosuvastatin Calcium (Crestor), 20 MG PO QPM





Scheduled PRN


Acetaminophen (Tylenol), 1,000 MG PO Q4 PRN for Headache or Pain


Nystatin (Nystatin Cream), 0 EXT TID PRN for


Nystatin (Topical) (Nystatin), 1 APPLN TD TID PRN for





Allergies


Coded Allergies:  


     Atorvastatin (Verified  Allergy, Mild, Unknown, on Crestor [facility] [id number] 

adm, 3/31/18)


     Azithromycin (Verified  Allergy, Mild, Unknown, 3/31/18)


     Penicillins (Verified  Allergy, Unknown, SWELLING & HIVES, 3/31/18)


     Lorazepam (Verified  Adverse Reaction, Intermediate, DELIRIUM, 3/31/18)


 Confusion, agitation


     Oxycodone (Verified  Adverse Reaction, Intermediate, NAUSEA AND VOMITING, 3

/31/18)





Physical Exam


Vital Signs











  Date Time  Temp Pulse Resp B/P (MAP) Pulse Ox O2 Delivery O2 Flow Rate FiO2


 


3/31/18 20:26  72 22 158/77 92   


 


3/31/18 19:31  75 24 155/98 92 Room Air  


 


3/31/18 19:10  80 18 170/81 92 Room Air  


 


3/31/18 18:53  74 24 186/82 92 Room Air  


 


3/31/18 17:57  69 19 184/96 93 Room Air  


 


3/31/18 17:52  68      


 


3/31/18 17:02 36.9 76 18 150/72 92 Room Air  











Physical Exam


GENERAL: Awake, alert, fatigued-appearing, in no distress


HENT: Normocephalic, atraumatic. Oropharynx unremarkable. Dry mucus membranes.


EYES: Normal conjunctiva. Sclera non-icteric.


NECK: Supple. No nuchal rigidity. FROM. No JVD.


RESPIRATORY: Clear to auscultation.


CARDIAC: Regular rate, normal rhythm. Extremities warm and well perfused. 

Pulses equal.


ABDOMEN: Soft, non-distended. No tenderness to palpation. No rebound or 

guarding. No masses.


RECTAL: Deferred.


MUSCULOSKELETAL: Chest examination reveals no tenderness. The back is 

symmetrical on inspection without obvious abnormality. There is no CVA 

tenderness to palpation. No joint edema. 


LOWER EXTREMITIES: Calves are equal size bilaterally and non-tender. No edema. 

No discoloration. 


NEURO: Normal sensorium. No sensory or motor deficits noted. 


SKIN: No rash or jaundice noted.





Medical Decision & Procedures


ER Provider


Diagnostic Interpretation:


Radiology results as stated below per my review and radiologist interpretation: 





SINGLE VIEW CHEST





FINDINGS: An AP, portable, upright chest radiograph is compared to study dated


1/31/2017. Correlation is made with chest CT dated 6/23/2008. The examination is


degraded by portable technique and patient rotation.  The heart is top normal


for projection and there is atherosclerotic calcification of the thoracic aorta.


The pulmonary vasculature is noncongested. Chronic interstitial thickening is


similar to previous. There is left basilar atelectasis. No airspace


consolidation or large pleural effusion is identified. No pneumothorax is seen.


The skeletal structures are osteopenic. The bony thorax is grossly intact. A


surgical anchor is noted in the right humeral head. Arthritic change is present


in the shoulders.





IMPRESSION: No acute cardiopulmonary abnormality.











Electronically signed by:  Sha Frederick M.D.





Laboratory Results


3/31/18 17:50








Red Blood Count 3.75, Mean Corpuscular Volume 100.5, Mean Corpuscular 

Hemoglobin 32.5, Mean Corpuscular Hemoglobin Concent 32.4, Mean Platelet Volume 

9.6, Neutrophils (%) (Auto) 81.7, Lymphocytes (%) (Auto) 8.2, Monocytes (%) (

Auto) 7.3, Eosinophils (%) (Auto) 2.0, Basophils (%) (Auto) 0.5, Neutrophils # (

Auto) 9.59, Lymphocytes # (Auto) 0.96, Monocytes # (Auto) 0.85, Eosinophils # (

Auto) 0.23, Basophils # (Auto) 0.06





3/31/18 17:50

















Test


  3/31/18


17:35 3/31/18


17:50 3/31/18


18:50


 


Influenza Type A (RT-PCR)


  Neg for Influ


A (NEG) 


  


 


 


Influenza Type B (RT-PCR)


  Neg for Influ


B (NEG) 


  


 


 


White Blood Count


  


  11.72 K/uL


(4.8-10.8) 


 


 


Red Blood Count


  


  3.75 M/uL


(4.2-5.4) 


 


 


Hemoglobin


  


  12.2 g/dL


(12.0-16.0) 


 


 


Hematocrit  37.7 % (37-47)  


 


Mean Corpuscular Volume


  


  100.5 fL


() 


 


 


Mean Corpuscular Hemoglobin


  


  32.5 pg


(25-34) 


 


 


Mean Corpuscular Hemoglobin


Concent 


  32.4 g/dl


(32-36) 


 


 


Platelet Count


  


  245 K/uL


(130-400) 


 


 


Mean Platelet Volume


  


  9.6 fL


(7.4-10.4) 


 


 


Neutrophils (%) (Auto)  81.7 %  


 


Lymphocytes (%) (Auto)  8.2 %  


 


Monocytes (%) (Auto)  7.3 %  


 


Eosinophils (%) (Auto)  2.0 %  


 


Basophils (%) (Auto)  0.5 %  


 


Neutrophils # (Auto)


  


  9.59 K/uL


(1.4-6.5) 


 


 


Lymphocytes # (Auto)


  


  0.96 K/uL


(1.2-3.4) 


 


 


Monocytes # (Auto)


  


  0.85 K/uL


(0.11-0.59) 


 


 


Eosinophils # (Auto)


  


  0.23 K/uL


(0-0.5) 


 


 


Basophils # (Auto)


  


  0.06 K/uL


(0-0.2) 


 


 


RDW Standard Deviation


  


  53.1 fL


(36.4-46.3) 


 


 


RDW Coefficient of Variation


  


  14.5 %


(11.5-14.5) 


 


 


Immature Granulocyte % (Auto)  0.3 %  


 


Immature Granulocyte # (Auto)


  


  0.03 K/uL


(0.00-0.02) 


 


 


Anion Gap


  


  3.0 mmol/L


(3-11) 


 


 


Est Creatinine Clear Calc


Drug Dose 


  37.5 ml/min 


  


 


 


Estimated GFR (


American) 


  47.3 


  


 


 


Estimated GFR (Non-


American 


  40.8 


  


 


 


BUN/Creatinine Ratio  12.9 (10-20)  


 


Calcium Level


  


  8.4 mg/dl


(8.5-10.1) 


 


 


Phosphorus Level


  


  2.9 mg/dl


(2.5-4.9) 


 


 


Magnesium Level


  


  2.0 mg/dl


(1.8-2.4) 


 


 


Total Bilirubin


  


  0.4 mg/dl


(0.2-1) 


 


 


Direct Bilirubin


  


  < 0.1 mg/dl


(0-0.2) 


 


 


Aspartate Amino Transf


(AST/SGOT) 


  15 U/L (15-37) 


  


 


 


Alanine Aminotransferase


(ALT/SGPT) 


  14 U/L (12-78) 


  


 


 


Alkaline Phosphatase


  


  73 U/L


() 


 


 


Troponin I


  


  < 0.015 ng/ml


(0-0.045) 


 


 


Total Protein


  


  7.5 gm/dl


(6.4-8.2) 


 


 


Albumin


  


  3.1 gm/dl


(3.4-5.0) 


 


 


Lipase


  


  113 U/L


() 


 


 


Urine Color   YELLOW 


 


Urine Appearance   CLEAR (CLEAR) 


 


Urine pH   7.5 (4.5-7.5) 


 


Urine Specific Gravity


  


  


  1.006


(1.000-1.030)


 


Urine Protein   NEG (NEG) 


 


Urine Glucose (UA)   NEG (NEG) 


 


Urine Ketones   NEG (NEG) 


 


Urine Occult Blood   NEG (NEG) 


 


Urine Nitrite   NEG (NEG) 


 


Urine Bilirubin   NEG (NEG) 


 


Urine Urobilinogen   NEG (NEG) 


 


Urine Leukocyte Esterase   MODERATE (NEG) 


 


Urine WBC (Auto)   >30 /hpf (0-5) 


 


Urine RBC (Auto)   0-4 /hpf (0-4) 


 


Urine Hyaline Casts (Auto)   1-5 /lpf (0-5) 


 


Urine Epithelial Cells (Auto)


  


  


  10-20 /lpf


(0-5)


 


Urine Bacteria (Auto)   3+ (NEG) 





Laboratory results reviewed by me





Medications Administered











 Medications


  (Trade)  Dose


 Ordered  Sig/Cora


 Route  Start Time


 Stop Time Status Last Admin


Dose Admin


 


 Sodium Chloride  500 ml @ 


 999 mls/hr  Q31M STAT


 IV  3/31/18 17:12


 3/31/18 17:42 DC 3/31/18 17:52


999 MLS/HR


 


 Ondansetron HCl


  (Zofran Inj)  4 mg  NOW  STAT


 IV  3/31/18 17:12


 3/31/18 17:19 DC 3/31/18 17:48


4 MG


 


 Ceftriaxone Sodium


  (Rocephin Inj)  1 gm  NOW  STAT


 IV  3/31/18 19:16


 3/31/18 19:17 DC 3/31/18 19:38


1 GM


 


 Sodium Chloride  500 ml @ 


 999 mls/hr  Q31M STAT


 IV  3/31/18 19:17


 3/31/18 19:47 DC 3/31/18 19:38


999 MLS/HR











ECG Per My Interpretation


Indication:  weakness


Rate (beats per minute):  66


Rhythm:  normal sinus (with SA)


Findings:  RBBB, no acute ischemic change





ED Course


1711: The patient was evaluated in room C9. A complete history and physical 

exam was performed.





2014: I updated the patient on  her test results. 





2025: I reevaluated the patient. Discussed results and discharge instructions: 

She verbalized understanding and agreement. The patient is ready for discharge.





Medical Decision


I reviewed the patient's past medical history, medications, and the nursing 

notes as described above.





Differential diagnosis:


Etiologies such as metabolic, infection, hypo/hyperglycemia, electrolyte 

abnormalities, cardiac sources, intracerebral event, toxicologic, neurologic, 

as well as others were entertained. 





The patient is 82-year-old woman who presents emergency department with 

generalized weakness, feverishness and chills in the setting of being treated 

outpatient for a UTI with Macrobid per hpi.  On arrival the patient is fatigued 

appearing but no acute distress, afebrile stable vital signs.  WBC within 

normal limits.  Chest x-ray negative for pneumonia.  Influenza negative.  UA 

consistent with UTI.  She feeling improved after IV fluid hydration.  While the 

patient's more recent culture results demonstrated Klebsiella pneumoniae and 

suggested susceptibility to Macrobid, will broaden coverage with IV dose of 

ceftriaxone and discharged on Keflex.  Patient is agreeable with this. Findings 

and plan for follow-up reviewed with patient. Patient agreeable and d/c'd per 

discharge instructions.





Medication Reconcilliation


Current Medication List:  was personally reviewed by me





Blood Pressure Screening


Patient's blood pressure:  Elevated blood pressure


Blood pressure disposition:  Elevated BP felt to be situational





Impression





 Primary Impression:  


 UTI (urinary tract infection)





Scribe Attestation


The scribe's documentation has been prepared under my direction and personally 

reviewed by me in its entirety. I confirm that the note above accurately 

reflects all work, treatment, procedures, and medical decision making performed 

by me.





Departure Information


Dispostion


Home / Self-Care





Prescriptions





Ondasetron Odt (ZOFRAN ODT) 4 Mg Tab


4 MG SL Q6H for Nausea, #10 TAB


   Prov: Mustapha Chadwick M.D.         3/31/18 


Cephalexin Monohydrate (Keflex) 500 Mg Cap


500 MG PO BID for 7 Days, #14 CAP


   Prov: Mustapha Chadwick M.D.         3/31/18





Referrals


Zac Wiseman M.D. (PCP)





Forms


HOME CARE DOCUMENTATION FORM,                                                 

               IMPORTANT VISIT INFORMATION





Patient Instructions


ED UTI Cystitis Female, My [facility]





Additional Instructions





Please follow up with your primary care physician in the next 1-3 days for re-

evaluation.





Your symptoms are most likely due to a persistent urinary tract infection.


Otherwise, your exam, EKG, chest xray, and lab results did not show signs of an 

emergent condition at this time.





Acetaminophen for pain and fevers as needed.


Keflex as directed.


Zofran as needed for nausea.


Drink plenty of fluids to ensure hydration.





Return to the emergency department for worsening symptoms as described in the 

accompanying instructions.

## 2018-04-02 NOTE — PHARMACY PROGRESS NOTE
ED Pharmacist Culture FollowUp


Date of Service:


Apr 2, 2018.





Patient was sent home with a prescription for Keflex 500 mg BID x 7 days, which 

should cover the Klebsiella pneumoniae growing from the patient's urine culture.

## 2018-04-23 ENCOUNTER — HOSPITAL ENCOUNTER (OUTPATIENT)
Dept: HOSPITAL 45 - C.LAB | Age: 83
Discharge: HOME | End: 2018-04-23
Attending: NURSE PRACTITIONER
Payer: COMMERCIAL

## 2018-04-23 DIAGNOSIS — R35.0: Primary | ICD-10-CM

## 2018-04-23 DIAGNOSIS — N39.0: ICD-10-CM

## 2018-05-16 ENCOUNTER — HOSPITAL ENCOUNTER (OUTPATIENT)
Dept: HOSPITAL 45 - C.LAB | Age: 83
Discharge: HOME | End: 2018-05-16
Attending: INTERNAL MEDICINE
Payer: COMMERCIAL

## 2018-05-16 DIAGNOSIS — N18.3: ICD-10-CM

## 2018-05-16 DIAGNOSIS — D64.9: ICD-10-CM

## 2018-05-16 DIAGNOSIS — D47.2: ICD-10-CM

## 2018-05-16 DIAGNOSIS — D53.1: ICD-10-CM

## 2018-05-16 DIAGNOSIS — I10: ICD-10-CM

## 2018-05-16 DIAGNOSIS — E11.9: Primary | ICD-10-CM

## 2018-05-16 DIAGNOSIS — I70.1: ICD-10-CM

## 2018-05-16 DIAGNOSIS — D55.9: ICD-10-CM

## 2018-05-16 LAB
ALBUMIN SERPL-MCNC: 3.7 GM/DL (ref 3.4–5)
BUN SERPL-MCNC: 26 MG/DL (ref 7–18)
CALCIUM SERPL-MCNC: 8.9 MG/DL (ref 8.5–10.1)
CO2 SERPL-SCNC: 29 MMOL/L (ref 21–32)
CREAT SERPL-MCNC: 1.3 MG/DL (ref 0.6–1.2)
EOSINOPHIL NFR BLD AUTO: 220 K/UL (ref 130–400)
GLUCOSE SERPL-MCNC: 88 MG/DL (ref 70–99)
HBA1C MFR BLD: 6.2 % (ref 4.5–5.6)
HCT VFR BLD CALC: 37.7 % (ref 37–47)
HGB BLD-MCNC: 12.2 G/DL (ref 12–16)
MCH RBC QN AUTO: 32.6 PG (ref 25–34)
MCHC RBC AUTO-ENTMCNC: 32.4 G/DL (ref 32–36)
MCV RBC AUTO: 100.8 FL (ref 80–100)
PHOSPHATE SERPL-MCNC: 3.2 MG/DL (ref 2.5–4.9)
PMV BLD AUTO: 10 FL (ref 7.4–10.4)
POTASSIUM SERPL-SCNC: 4 MMOL/L (ref 3.5–5.1)
RED CELL DISTRIBUTION WIDTH CV: 15.6 % (ref 11.5–14.5)
RED CELL DISTRIBUTION WIDTH SD: 57.2 FL (ref 36.4–46.3)
SODIUM SERPL-SCNC: 137 MMOL/L (ref 136–145)
WBC # BLD AUTO: 5.67 K/UL (ref 4.8–10.8)

## 2018-08-23 ENCOUNTER — HOSPITAL ENCOUNTER (OUTPATIENT)
Dept: HOSPITAL 45 - C.LAB | Age: 83
Discharge: HOME | End: 2018-08-23
Attending: NURSE PRACTITIONER
Payer: COMMERCIAL

## 2018-08-23 DIAGNOSIS — N39.0: Primary | ICD-10-CM

## 2019-10-22 NOTE — EMERGENCY ROOM VISIT NOTE
History


First contact with patient:  21:35


Chief Complaint:  FALL


Stated Complaint:  FELL AND HIT HEAD





History of Present Illness


The patient is a 81 year old female who presents to the Emergency Room via 

private vehicle with complaints of "fell and hit head".  The patient states 

that this evening around 6 PM, she was at home bending down cleaning the 

doorway tract, when she actually fell forward losing her balance striking her 

head off of the concrete.  She notes diffuse head pain, worse on top of her 

head.  She also notes right arm and left arm pain.  There is also pain in the 

back between the shoulder blades.  She has history of right rotator cuff 

repair.  Her tetanus is believed to be up-to-date.  She declines pain medication

, denies any chest pain, shortness of breath, loss of consciousness, abdominal 

pain or hip pain.





Review of Systems


A complete 6-point Review of Systems was discussed with the patient, with 

pertinent positives and negatives listed in the History of Present Illness. All 

remaining Review of Systems questions can be considered negative unless 

otherwise specified.





Past Medical/Surgical History


Medical Problems:


(1) Complicated urinary tract infection


(2) Lumbar stenosis with neurogenic claudication


(3) Oral candidiasis








Family History





Coronary artery disease


Diabetes mellitus


Hypertension


Stroke





Social History


Smoking Status:  Never Smoker


Drug Use:  none


Marital Status:  


Housing Status:  lives with significant other


Occupation Status:  retired





Current/Historical Medications


Scheduled


Amlodipine (Norvasc), 5 MG PO QAM


Aspirin (Aspirin Ec), 81 MG PO QAM


Calcium Carbonate-Cholecalcife (Calcium 1000 + D), 1 TAB PO QAM


Cholecalciferol (Vitamin D3), 1 CAP PO QAM


Cyanocobalamin (Vitamin B12), 1 CAP PO DAILY


Esomeprazole Magnesium (Nexium), 40 MG PO BID


Folic Acid (Folic Acid), 1 TAB PO 6XWEEK


Gabapentin (Neurontin), 100 MG PO BID


Methotrexate (Methotrexate), 5 TABS PO WK


Metoprolol Tartrate (Lopressor) (Lopressor), 50 MG PO BID


Nitroglycerin (Nitrostat), 0.4 MG UT PRN


Prednisone (Prednisone), 5 MG PO QAM


Rosuvastatin Calcium (Crestor), 20 MG PO QPM





Scheduled PRN


Acetaminophen (Tylenol), 1,000 MG PO Q4 PRN for Headache or Pain


Diphenhydramine Hcl (Sleep) (Simply Sleep), 1 TAB PO HS PRN for Sleep


Nystatin (Nystatin Cream), 0 EXT TID PRN for


Nystatin (Topical) (Nystatin), 1 APPLN TD TID PRN for


Polyethylene Glycol 3350 (Miralax), 17 GM PO DAILY PRN for Constipation


Tramadol (Ultram), 50 MG PO BID PRN for Pain





Allergies


Coded Allergies:  


     Atorvastatin (Verified  Allergy, Mild, Unknown, on Crestor PTHM [id number] 

adm, 1/30/17)


     Azithromycin (Verified  Allergy, Mild, Unknown, 1/30/17)


     Penicillins (Verified  Allergy, Unknown, SWELLING & HIVES, 2/1/17)


     Lorazepam (Verified  Adverse Reaction, Intermediate, DELIRIUM, 2/1/17)


 Confusion, agitation


     Oxycodone (Verified  Adverse Reaction, Intermediate, NAUSEA AND VOMITING, 1 /30/17)





Physical Exam


Vital Signs











  Date Time  Temp Pulse Resp B/P (MAP) Pulse Ox O2 Delivery O2 Flow Rate FiO2


 


6/21/17 23:34  57 18 140/70 94   


 


6/21/17 21:06 36.9 62 18 153/63 95 Room Air  











Physical Exam


VITAL SIGNS - Vital signs and nursing notes were reviewed.  Stable


GENERAL -81-year-old female appearing her stated age. Communicates well with 

provider and answers questions appropriately.


SKIN - Gross examination of the entire body surface demonstrates no lacerations 

to the body surface.  There are abrasions noted to the left anterior knee


HEAD - Normocephalic, Atraumatic. No Santos's Sign or Raccoon's Eyes. No 

depressed skull fractures palpable.


EYES - PERRL with EOMI bilaterally. Without subconjunctival hemorrhage. 

Palpebral conjunctiva pink and moist with no injection.


EARS - No deformities of external structures noted on gross examination 

bilaterally. No hemotympanum present. No tympanic perforation noted. Handle of 

malleus, umbo, cone of light, pars tensa/flaccid all easily visualized.


NOSE - Midline and without cyanosis. No epistaxis or clear watery discharge 

noted. Septum midline without deviation. No septal hematoma noted. No overlying 

ecchymosis noted.


MOUTH/OROPHARYNX - Without perioral cyanosis. Tongue midline with equal 

elevation of palate bilaterally. No blood noted in the oropharynx. No tonsillar 

hypertrophy, erythema, or exudates noted. No dental fractures noted.


NECK - no tenderness to palpation over the cervical spinous processes.  There 

is cervical paraspinal muscle tenderness noted.


LUNGS - Chest wall symmetric without accessory muscle use, intercostals 

retractions, or central cyanosis. No flail chest or depressed fractures noted. 

No paradoxical chest wall movements noted.  No tenderness to palpation across 

the anterior and posterior chest walls.  Normal vesicular breath sounds CTA B/

L. No wheezes, rales, or rhonchi appreciated.


CARDIAC - RRR with S1/S2. No murmur, rubs, or gallops appreciated.


EXTREMITIES - No gross deformities noted of the extremities.  There is 

tenderness to palpation middle portion of the right humerus and left humerus.  

She is neurovascularly intact in her extremities +5/5 strength noted in UE/LE 

bilaterally.


NEUROLOGIC - Cranial nerves II through XII grossly intact. Sensory intact to 

light touch throughout.


PSYCH -Pt is very pleasant and interacts well with examiner.





Medical Decision & Procedures


ER Provider


Diagnostic Interpretation:


CT HEAD WITHOUT CONTRAST (CT)





CLINICAL HISTORY: Head and neck pain status post trauma    





COMPARISON STUDY:  No previous studies for comparison.





TECHNIQUE:  Axial CT of the brain is performed from the vertex to the skull


base. IV contrast was not administered for this examination.





CT DOSE:    





FINDINGS:





No intra or extra-axial mass lesions are visualized. There is no CT evidence of


acute cortical infarction. There is no evidence of midline shift. There is no


acute  hemorrhage. No calvarial fractures are visualized. 


There are patchy white matter hypodensities likely on a small vessel basis.


There is no evidence of pathologic ventricular dilatation.


There is no evidence of acute sinusitis. There is a scalp hematoma at the


anterior vertex.





IMPRESSION: Scalp hematoma. No acute intracranial findings.











Electronically signed by:  Avi Cruz M.D.


6/21/2017 10:38 PM





Dictated Date/Time:  6/21/2017 10:37 PM





CT OF THE CERVICAL SPINE





CLINICAL HISTORY: Neck pain status post trauma    





COMPARISON STUDY:  No previous studies for comparison. 





CT DOSE: 1089.27 mGy.cm





TECHNIQUE: CT scan of the cervical spine was performed from the skull base to


the thoracic inlet. Images are reviewed in the axial, sagittal, and coronal


planes. IV contrast was not administered for this examination.





FINDINGS:





The visualized portions of the lung apices reveal no evidence of pneumothorax.





The prevertebral soft tissues are normal.  No fractures or subluxations are


visualized.





There are multilevel degenerative changes








IMPRESSION: No evidence of acute fracture or traumatic subluxation.











Electronically signed by:  Avi Cruz M.D.


6/21/2017 10:43 PM





Dictated Date/Time:  6/21/2017 10:40 PM





THORACIC SPINE 3 VIEWS ROUTINE





CLINICAL HISTORY: Back pain status post trauma    





COMPARISON STUDY:  No previous studies for comparison.





FINDINGS: The bones are mildly osteopenic. The paraspinal line is not


significantly displaced. There are mild multilevel degenerative changes. No


acute fractures or subluxations are visualized.





IMPRESSION:  No acute fractures identified. 














Electronically signed by:  Avi Cruz M.D.


6/21/2017 10:52 PM





Dictated Date/Time:  6/21/2017 10:51 PM





LEFT HUMERUS MIN 2 VIEWS ROUTINE





CLINICAL HISTORY: Left humeral pain status post trauma     





COMPARISON: None.





DISCUSSION: No acute fractures or dislocations are visualized.    





IMPRESSION: No fractures identified.











Electronically signed by:  Avi Cruz M.D.


6/21/2017 10:50 PM





Dictated Date/Time:  6/21/2017 10:50 PM





RIGHT HUMERUS MIN 2 VIEWS ROUTINE





CLINICAL HISTORY: Right humeral pain status post trauma     





COMPARISON: None.





DISCUSSION: No fractures or dislocations are visualized. Soft tissue anchors are


visualized in the humeral head.    





IMPRESSION: Postsurgical change. No acute fractures are visualized.











Electronically signed by:  Avi Cruz M.D.


6/21/2017 10:51 PM





Dictated Date/Time:  6/21/2017 10:50 PM





Medications Administered











 Medications


  (Trade)  Dose


 Ordered  Sig/Cora


 Route  Start Time


 Stop Time Status Last Admin


Dose Admin


 


 Diphtheria/


 Pertussis/Tetanus


 Vacc


  (Adacel Inj)  0.5 ml  ONCE ONCE


 IM.  6/21/17 23:30


 6/21/17 23:31 DC 6/21/17 23:33


0.5 ML











Medical Decision


Patient was seen and evaluated as above.  After obtaining a thorough history 

and physical examination





Radiographs were obtained of the affected regions as well as a CT scan of the 

head and neck.  These were negative for acute process.





Patient persist was today status post fall, she has been doing well otherwise.  

Her radiographs and CT scans are negative for acute process.  She was educated 

upon findings of today's studies.  She appears stable for discharge.  She was 

evaluated by my attending.  I suspect the patient has fallen secondary to 

losing her balance.  Bedside EKG reveals sinus bradycardia, rate of 54 bpm, 

right bundle branch block noted of which was found on previous EKG.  The 

patient was educated upon today's findings, was educated upon worrisome 

symptoms which to return, had questions as per discharge, and was discharged 

home in good condition.





  In evaluation treatment this patient following differential diagnoses were 

entertained: MI, fracture, acute intracranial bleed, among others.





Impression





 Primary Impression:  


 Fall


 Additional Impression:  


 Contusion of multiple sites





Departure Information


Dispostion


Home / Self-Care





Condition


GOOD





Referrals


Zac Wiseman M.D. (PCP)





Patient Instructions


[facility]





Additional Instructions





You have been treated in the Emergency Department for a Closed Head Injury, 

fall and arm pain. 





For pain control, you can use the following over-the-counter medicines (if >11 yo):





- Regular strength (325mg/tab) Tylenol (acetaminophen) 2 tabs every 4-6 hours 

as needed. Do not exceed 12 tablets in a 24 hour period. Avoid taking more than 

3 grams (3000 mg) of Tylenol per day. This includes any other sources of 

acetaminophen you may take on a regular basis.





- Regular strength (200 mg/tab) Advil (ibuprofen) 1-2 tabs every 4-6 hours as 

needed. Do not exceed a dose of 3200 mg per day.





You should relax in a quiet, dark place for the rest of the day. Avoid any 

possible triggers including: cigarette smoke, caffeine, nicotine, chocolate, 

wine, beer, loud noises or music, or bright lights. 





You should schedule a follow-up appointment in 2-3 days with your Primary Care 

Provider .





Return to the Emergency Department if your current symptoms worsen despite 

treatment course outlined above, or if you develop any of the following symptoms

: intractable pain despite aforementioned treatment course, visual disturbances

, loss of vision, unilateral weakness or facial drooping, slurring of speech, 

loss of coordination, or loss of consciousness.








Please return to emergency department with any new/concerning symptoms.





Problem Qualifiers








 Primary Impression:  


 Fall


 Encounter type:  initial encounter  Qualified Codes:  W19.XXXA - Unspecified 

fall, initial encounter Valtrex was filled 10/18/19, disp 90 with 3 refills to the same pharmacy.  This is a duplicate.